# Patient Record
Sex: MALE | Race: BLACK OR AFRICAN AMERICAN | Employment: UNEMPLOYED | URBAN - METROPOLITAN AREA
[De-identification: names, ages, dates, MRNs, and addresses within clinical notes are randomized per-mention and may not be internally consistent; named-entity substitution may affect disease eponyms.]

---

## 2018-04-13 ENCOUNTER — HOSPITAL ENCOUNTER (INPATIENT)
Age: 68
LOS: 4 days | Discharge: HOME | DRG: 871 | End: 2018-04-18
Attending: EMERGENCY MEDICINE | Admitting: INTERNAL MEDICINE
Payer: MEDICARE

## 2018-04-13 DIAGNOSIS — A41.9 SEPSIS, DUE TO UNSPECIFIED ORGANISM: Primary | ICD-10-CM

## 2018-04-13 DIAGNOSIS — R77.8 ELEVATED TROPONIN: ICD-10-CM

## 2018-04-13 PROCEDURE — 93005 ELECTROCARDIOGRAM TRACING: CPT

## 2018-04-13 PROCEDURE — 51798 US URINE CAPACITY MEASURE: CPT

## 2018-04-13 PROCEDURE — 85610 PROTHROMBIN TIME: CPT | Performed by: EMERGENCY MEDICINE

## 2018-04-13 PROCEDURE — 83605 ASSAY OF LACTIC ACID: CPT | Performed by: EMERGENCY MEDICINE

## 2018-04-13 PROCEDURE — 36415 COLL VENOUS BLD VENIPUNCTURE: CPT | Performed by: EMERGENCY MEDICINE

## 2018-04-13 PROCEDURE — 87077 CULTURE AEROBIC IDENTIFY: CPT | Performed by: EMERGENCY MEDICINE

## 2018-04-13 PROCEDURE — 80053 COMPREHEN METABOLIC PANEL: CPT | Performed by: EMERGENCY MEDICINE

## 2018-04-13 PROCEDURE — 84484 ASSAY OF TROPONIN QUANT: CPT | Performed by: EMERGENCY MEDICINE

## 2018-04-13 PROCEDURE — 85025 COMPLETE CBC W/AUTO DIFF WBC: CPT | Performed by: EMERGENCY MEDICINE

## 2018-04-13 PROCEDURE — 87040 BLOOD CULTURE FOR BACTERIA: CPT | Performed by: EMERGENCY MEDICINE

## 2018-04-13 PROCEDURE — 87186 SC STD MICRODIL/AGAR DIL: CPT | Performed by: EMERGENCY MEDICINE

## 2018-04-13 PROCEDURE — 74011250636 HC RX REV CODE- 250/636: Performed by: EMERGENCY MEDICINE

## 2018-04-13 RX ORDER — ACETAMINOPHEN 500 MG
1000 TABLET ORAL
Status: DISCONTINUED | OUTPATIENT
Start: 2018-04-13 | End: 2018-04-14

## 2018-04-13 RX ORDER — CEPHALEXIN 500 MG/1
500 CAPSULE ORAL 4 TIMES DAILY
COMMUNITY
Start: 2018-04-13 | End: 2018-04-18

## 2018-04-13 RX ORDER — METFORMIN HYDROCHLORIDE 500 MG/1
250 TABLET ORAL 2 TIMES DAILY WITH MEALS
Status: ON HOLD | COMMUNITY
End: 2018-04-18

## 2018-04-13 RX ORDER — ACETAMINOPHEN 500 MG
500 TABLET ORAL
COMMUNITY
End: 2018-04-18

## 2018-04-13 RX ORDER — OXYBUTYNIN CHLORIDE 5 MG/1
5 TABLET ORAL 3 TIMES DAILY
COMMUNITY
End: 2018-04-18

## 2018-04-13 RX ORDER — OMEPRAZOLE 20 MG/1
20 CAPSULE, DELAYED RELEASE ORAL 2 TIMES DAILY
Status: ON HOLD | COMMUNITY
End: 2018-04-18

## 2018-04-13 RX ADMIN — SODIUM CHLORIDE 1000 ML: 900 INJECTION, SOLUTION INTRAVENOUS at 23:42

## 2018-04-13 NOTE — IP AVS SNAPSHOT
[address] 
[phone] Patient: Lazaro Garza MRN: [id number] :1950 About your hospitalization You were admitted on:  2018 You last received care in the:  [facility]  MED SURG 2 You were discharged on:  2018 Why you were hospitalized Your primary diagnosis was:  Not on File Your diagnoses also included:  Elevated Troponin, Septic Shock (Hcc), Scott (Acute Kidney Injury) (Hcc), High Anion Gap Metabolic Acidosis, Hydronephrosis With Renal And Ureteral Calculous Obstruction, Type 2 Diabetes Mellitus Without Complication (Hcc), Emphysema Of Lung (Hcc) Follow-up Information Follow up With Details Comments Contact Info Barby Zarate MD Schedule an appointment as soon as possible for a visit to schedule follow up [address] [place] [address] 
[phone] None   None (395) Patient stated that they have no PCP Discharge Orders None A check jeremy indicates which time of day the medication should be taken. My Medications START taking these medications Instructions Each Dose to Equal  
 Morning Noon Evening Bedtime  
 aspirin delayed-release 325 mg tablet Start taking on:  2018 Your last dose was:   
8 am  
Your next dose is:   
8 am  
   
 Take 1 Tab by mouth daily. 325 mg  
    
   
   
   
  
 atorvastatin 40 mg tablet Commonly known as:  LIPITOR Your last dose was:   
8 pm  
Your next dose is:   
8 pm  
   
 Take 1 Tab by mouth nightly. 40 mg  
    
   
   
   
  
 cefdinir 300 mg capsule Commonly known as:  OMNICEF Take 1 Cap by mouth two (2) times a day. 300 mg  
    
   
   
   
  
 metoprolol succinate 25 mg XL tablet Commonly known as:  TOPROL-XL Start taking on:  2018 Your last dose was:   
8 am  
Your next dose is:   
8 am  
   
 Take 0.5 Tabs by mouth daily. 12.5 mg  
    
   
   
   
  
  
CONTINUE taking these medications Instructions Each Dose to Equal  
 Morning Noon Evening Bedtime MATIAS Sierra Apply  to affected area two (2) times daily as needed. metFORMIN 500 mg tablet Commonly known as:  GLUCOPHAGE Take 250 mg by mouth two (2) times daily (with meals). 250 mg  
    
   
   
   
  
 omeprazole 20 mg capsule Commonly known as:  PRILOSEC Take 20 mg by mouth two (2) times a day. 20 mg  
    
   
   
   
  
 oxybutynin 5 mg tablet Commonly known as:  ANUJTRWP Take 5 mg by mouth three (3) times daily. 5 mg SENNA-S PO Take 1 Tab by mouth nightly. 1 Tab TYLENOL EXTRA STRENGTH 500 mg tablet Generic drug:  acetaminophen Take 500 mg by mouth three (3) times daily as needed for Fever (temperature greater than 100F). 500 mg  
    
   
   
   
  
 vitamin a & d ointment Commonly known as:  A&D Apply  to affected area three (3) times daily. Apply to affected area. STOP taking these medications KEFLEX 500 mg capsule Generic drug:  cephALEXin Where to Get Your Medications Information on where to get these meds will be given to you by the nurse or doctor. ! Ask your nurse or doctor about these medications  
  aspirin delayed-release 325 mg tablet  
 atorvastatin 40 mg tablet  
 cefdinir 300 mg capsule  
 metoprolol succinate 25 mg XL tablet Discharge Instructions HOSPITALIST DISCHARGE INSTRUCTIONS 
NAME: Shelley Aguiar :  1950 MRN:  [id number] Date/Time:  2018 12:09 PM 
 
ADMIT DATE: 2018 DISCHARGE DATE: 2018 ADMITTING DIAGNOSIS: 
Ureter stone UTI 
CAD DISCHARGE DIAGNOSIS: 
As above MEDICATIONS: 
 
· It is important that you take the medication exactly as they are prescribed. · Keep your medication in the bottles provided by the pharmacist and keep a list of the medication names, dosages, and times to be taken in your wallet. · Do not take other medications without consulting your doctor. Pain Management: per above medications What to do at Home: 1. Pt refused cardiac cath while hospitalized. His cardiac regimen has been optimized. He should follow up with [facility] for further care 2. Follow up with urology in 2-3 weeks for further management of kidney stone Recommended diet:  Cardiac Diet Recommended activity: Activity as tolerated If you experience any of the following symptoms then please call your primary care physician or return to the emergency room if you cannot get hold of your doctor: 
Fever, chills, nausea, vomiting, diarrhea, change in mentation, falling, bleeding, shortness of breath Information obtained by : 
I understand that if any problems occur once I am at home I am to contact my physician. I understand and acknowledge receipt of the instructions indicated above. Physician's or R.N.'s Signature                                                                  Date/Time Patient or Representative Signature                                                          Date/Time [employer] Announcement We are excited to announce that we are making your provider's discharge notes available to you in [employer]. You will see these notes when they are completed and signed by the physician that discharged you from your recent hospital stay.   If you have any questions or concerns about any information you see in [employer], please call the [employer] Department where you were seen or reach out to your Primary Care Provider for more information about your plan of care. Introducing [facility]! [employer] introduces uSpeakt patient portal. Now you can access parts of your medical record, email your doctor's office, and request medication refills online. 1. In your internet browser, go to [email or web address] 2. Click on the First Time User? Click Here link in the Sign In box. You will see the New Member Sign Up page. 3. Enter your Appstarter Access Code exactly as it appears below. You will not need to use this code after youve completed the sign-up process. If you do not sign up before the expiration date, you must request a new code. · Appstarter Access Code: [email or web address] Expires: 7/12/2018 10:57 PM 
 
4. Enter the last four digits of your Social Security Number (xxxx) and Date of Birth (mm/dd/yyyy) as indicated and click Submit. You will be taken to the next sign-up page. 5. Create a Appstarter ID. This will be your Appstarter login ID and cannot be changed, so think of one that is secure and easy to remember. 6. Create a Appstarter password. You can change your password at any time. 7. Enter your Password Reset Question and Answer. This can be used at a later time if you forget your password. 8. Enter your e-mail address. You will receive e-mail notification when new information is available in [address] 9. Click Sign Up. You can now view and download portions of your medical record. 10. Click the Download Summary menu link to download a portable copy of your medical information. If you have questions, please visit the Frequently Asked Questions section of the Appstarter website. Remember, Appstarter is NOT to be used for urgent needs. For medical emergencies, dial 911. Now available from your iPhone and Android! Introducing Partha Resendiz As a [facility] patient, I wanted to make you aware of our electronic visit tool called Partha Panfilogiorgiojyoti. [facility] 24/7 allows you to connect within minutes with a medical provider 24 hours a day, seven days a week via a mobile device or tablet or logging into a secure website from your computer. You can access Partha Whittakergiorgiofin from anywhere in the [place]. A virtual visit might be right for you when you have a simple condition and feel like you just dont want to get out of bed, or cant get away from work for an appointment, when your regular [facility] provider is not available (evenings, weekends or holidays), or when youre out of town and need minor care. Electronic visits cost only $49 and if the [facility] 24/7 provider determines a prescription is needed to treat your condition, one can be electronically transmitted to a nearby pharmacy*. Please take a moment to enroll today if you have not already done so. The enrollment process is free and takes just a few minutes. To enroll, please download the Valentin Uzhun 24/7 jodi to your tablet or phone, or visit [email or web address] to enroll on your computer. And, as an [address] [place] [address] patient with a [employer] account, the results of your visits will be scanned into your electronic medical record and your primary care provider will be able to view the scanned results. We urge you to continue to see your regular [facility] provider for your ongoing medical care. And while your primary care provider may not be the one available when you seek a Partha Panfiloletha virtual visit, the peace of mind you get from getting a real diagnosis real time can be priceless. For more information on Partha Panfiloletha, view our Frequently Asked Questions (FAQs) at [email or web address]. Sincerely, 
 
Joy Carvalho MD 
Chief Medical Officer [facility] *:  certain medications cannot be prescribed via Partha Resendiz Unresulted Labs-Please follow up with your PCP about these lab tests Order Current Status CULTURE, BLOOD Preliminary result CULTURE, BLOOD, PAIRED Preliminary result Providers Seen During Your Hospitalization Provider Specialty Primary office phone Aster Morales MD Emergency Medicine [phone] Alphia Nissen, MD Internal Medicine [phone] Rosemarie Lakhani MD Internal Medicine [phone] Tish Staley MD Internal Medicine [phone] Your Primary Care Physician (PCP) Primary Care Physician Office Phone Office Fax NONE ** None ** ** None ** You are allergic to the following No active allergies Recent Documentation Height Weight BMI Smoking Status 1.803 m 61.7 kg 18.97 kg/m2 Former Smoker Patient Belongings The following personal items are in your possession at time of discharge: 
  Dental Appliances: None  Visual Aid: At home      Home Medications: None   Jewelry: None  Clothing: With patient    Other Valuables: None Please provide this summary of care documentation to your next provider. Signatures-by signing, you are acknowledging that this After Visit Summary has been reviewed with you and you have received a copy. Patient Signature:  ____________________________________________________________ Date:  ____________________________________________________________  
  
Demar Andrew Provider Signature:  ____________________________________________________________ Date:  ____________________________________________________________

## 2018-04-14 ENCOUNTER — APPOINTMENT (OUTPATIENT)
Dept: CT IMAGING | Age: 68
DRG: 871 | End: 2018-04-14
Attending: INTERNAL MEDICINE
Payer: MEDICARE

## 2018-04-14 ENCOUNTER — ANESTHESIA EVENT (OUTPATIENT)
Dept: SURGERY | Age: 68
DRG: 871 | End: 2018-04-14
Payer: MEDICARE

## 2018-04-14 ENCOUNTER — APPOINTMENT (OUTPATIENT)
Dept: GENERAL RADIOLOGY | Age: 68
DRG: 871 | End: 2018-04-14
Attending: EMERGENCY MEDICINE
Payer: MEDICARE

## 2018-04-14 ENCOUNTER — ANESTHESIA (OUTPATIENT)
Dept: SURGERY | Age: 68
DRG: 871 | End: 2018-04-14
Payer: MEDICARE

## 2018-04-14 ENCOUNTER — APPOINTMENT (OUTPATIENT)
Dept: GENERAL RADIOLOGY | Age: 68
DRG: 871 | End: 2018-04-14
Attending: UROLOGY
Payer: MEDICARE

## 2018-04-14 ENCOUNTER — APPOINTMENT (OUTPATIENT)
Dept: CT IMAGING | Age: 68
DRG: 871 | End: 2018-04-14
Attending: EMERGENCY MEDICINE
Payer: MEDICARE

## 2018-04-14 PROBLEM — A41.9 SEVERE SEPSIS (HCC): Status: ACTIVE | Noted: 2018-04-14

## 2018-04-14 PROBLEM — R65.20 SEVERE SEPSIS (HCC): Status: ACTIVE | Noted: 2018-04-14

## 2018-04-14 PROBLEM — N17.9 AKI (ACUTE KIDNEY INJURY) (HCC): Status: ACTIVE | Noted: 2018-04-14

## 2018-04-14 PROBLEM — E87.29 HIGH ANION GAP METABOLIC ACIDOSIS: Status: ACTIVE | Noted: 2018-04-14

## 2018-04-14 PROBLEM — A41.9 SEPTIC SHOCK (HCC): Status: ACTIVE | Noted: 2018-04-14

## 2018-04-14 PROBLEM — E11.9 DIABETES (HCC): Status: ACTIVE | Noted: 2018-04-14

## 2018-04-14 PROBLEM — E11.9 TYPE 2 DIABETES MELLITUS WITHOUT COMPLICATION (HCC): Status: ACTIVE | Noted: 2018-04-14

## 2018-04-14 PROBLEM — N13.2 HYDRONEPHROSIS WITH RENAL AND URETERAL CALCULOUS OBSTRUCTION: Status: ACTIVE | Noted: 2018-04-14

## 2018-04-14 PROBLEM — R65.21 SEPTIC SHOCK (HCC): Status: ACTIVE | Noted: 2018-04-14

## 2018-04-14 PROBLEM — J43.9 EMPHYSEMA OF LUNG (HCC): Status: ACTIVE | Noted: 2018-04-14

## 2018-04-14 PROBLEM — R77.8 ELEVATED TROPONIN: Status: ACTIVE | Noted: 2018-04-14

## 2018-04-14 LAB
ALBUMIN SERPL-MCNC: 2.6 G/DL (ref 3.5–5)
ALBUMIN/GLOB SERPL: 0.5 {RATIO} (ref 1.1–2.2)
ALP SERPL-CCNC: 99 U/L (ref 45–117)
ALT SERPL-CCNC: 18 U/L (ref 12–78)
AMORPH CRY URNS QL MICRO: ABNORMAL
ANION GAP SERPL CALC-SCNC: 13 MMOL/L (ref 5–15)
ANION GAP SERPL CALC-SCNC: 15 MMOL/L (ref 5–15)
ANION GAP SERPL CALC-SCNC: 16 MMOL/L (ref 5–15)
APPEARANCE UR: CLEAR
APTT PPP: 40.5 SEC (ref 22.1–32)
ARTERIAL PATENCY WRIST A: YES
AST SERPL-CCNC: 20 U/L (ref 15–37)
B PERT DNA SPEC QL NAA+PROBE: NOT DETECTED
BACTERIA URNS QL MICRO: ABNORMAL /HPF
BASE DEFICIT BLDA-SCNC: 5.6 MMOL/L
BASOPHILS # BLD: 0 K/UL (ref 0–0.1)
BASOPHILS # BLD: 0 K/UL (ref 0–0.1)
BASOPHILS NFR BLD: 0 % (ref 0–1)
BASOPHILS NFR BLD: 0 % (ref 0–1)
BDY SITE: ABNORMAL
BILIRUB SERPL-MCNC: 0.9 MG/DL (ref 0.2–1)
BILIRUB UR QL CFM: NEGATIVE
BREATHS.SPONTANEOUS ON VENT: 24
BUN SERPL-MCNC: 18 MG/DL (ref 6–20)
BUN SERPL-MCNC: 19 MG/DL (ref 6–20)
BUN SERPL-MCNC: 22 MG/DL (ref 6–20)
BUN/CREAT SERPL: 13 (ref 12–20)
BUN/CREAT SERPL: 14 (ref 12–20)
BUN/CREAT SERPL: 14 (ref 12–20)
C PNEUM DNA SPEC QL NAA+PROBE: NOT DETECTED
CALCIUM SERPL-MCNC: 7 MG/DL (ref 8.5–10.1)
CALCIUM SERPL-MCNC: 7.4 MG/DL (ref 8.5–10.1)
CALCIUM SERPL-MCNC: 8.6 MG/DL (ref 8.5–10.1)
CHLORIDE SERPL-SCNC: 102 MMOL/L (ref 97–108)
CHLORIDE SERPL-SCNC: 103 MMOL/L (ref 97–108)
CHLORIDE SERPL-SCNC: 97 MMOL/L (ref 97–108)
CHOLEST SERPL-MCNC: 128 MG/DL
CHOLEST SERPL-MCNC: 92 MG/DL
CO2 SERPL-SCNC: 19 MMOL/L (ref 21–32)
CO2 SERPL-SCNC: 19 MMOL/L (ref 21–32)
CO2 SERPL-SCNC: 24 MMOL/L (ref 21–32)
COLOR UR: ABNORMAL
CREAT SERPL-MCNC: 1.4 MG/DL (ref 0.7–1.3)
CREAT SERPL-MCNC: 1.43 MG/DL (ref 0.7–1.3)
CREAT SERPL-MCNC: 1.55 MG/DL (ref 0.7–1.3)
DIFFERENTIAL METHOD BLD: ABNORMAL
DIFFERENTIAL METHOD BLD: ABNORMAL
EOSINOPHIL # BLD: 0 K/UL (ref 0–0.4)
EOSINOPHIL # BLD: 0 K/UL (ref 0–0.4)
EOSINOPHIL NFR BLD: 0 % (ref 0–7)
EOSINOPHIL NFR BLD: 0 % (ref 0–7)
EPITH CASTS URNS QL MICRO: ABNORMAL /LPF
ERYTHROCYTE [DISTWIDTH] IN BLOOD BY AUTOMATED COUNT: 16.3 % (ref 11.5–14.5)
ERYTHROCYTE [DISTWIDTH] IN BLOOD BY AUTOMATED COUNT: 16.3 % (ref 11.5–14.5)
FIO2 ON VENT: 21 %
FLUAV AG NPH QL IA: NEGATIVE
FLUAV H1 2009 PAND RNA SPEC QL NAA+PROBE: NOT DETECTED
FLUAV H1 RNA SPEC QL NAA+PROBE: NOT DETECTED
FLUAV H3 RNA SPEC QL NAA+PROBE: NOT DETECTED
FLUAV SUBTYP SPEC NAA+PROBE: NOT DETECTED
FLUBV AG NOSE QL IA: NEGATIVE
FLUBV RNA SPEC QL NAA+PROBE: NOT DETECTED
GLOBULIN SER CALC-MCNC: 5 G/DL (ref 2–4)
GLUCOSE BLD STRIP.AUTO-MCNC: 106 MG/DL (ref 65–100)
GLUCOSE BLD STRIP.AUTO-MCNC: 126 MG/DL (ref 65–100)
GLUCOSE BLD STRIP.AUTO-MCNC: 130 MG/DL (ref 65–100)
GLUCOSE SERPL-MCNC: 123 MG/DL (ref 65–100)
GLUCOSE SERPL-MCNC: 138 MG/DL (ref 65–100)
GLUCOSE SERPL-MCNC: 140 MG/DL (ref 65–100)
GLUCOSE UR STRIP.AUTO-MCNC: NEGATIVE MG/DL
HADV DNA SPEC QL NAA+PROBE: NOT DETECTED
HCO3 BLDA-SCNC: 16 MMOL/L (ref 22–26)
HCOV 229E RNA SPEC QL NAA+PROBE: NOT DETECTED
HCOV HKU1 RNA SPEC QL NAA+PROBE: NOT DETECTED
HCOV NL63 RNA SPEC QL NAA+PROBE: NOT DETECTED
HCOV OC43 RNA SPEC QL NAA+PROBE: NOT DETECTED
HCT VFR BLD AUTO: 29.6 % (ref 36.6–50.3)
HCT VFR BLD AUTO: 34.3 % (ref 36.6–50.3)
HDLC SERPL-MCNC: 22 MG/DL
HDLC SERPL-MCNC: 29 MG/DL
HDLC SERPL: 4.2 {RATIO} (ref 0–5)
HDLC SERPL: 4.4 {RATIO} (ref 0–5)
HGB BLD-MCNC: 11.2 G/DL (ref 12.1–17)
HGB BLD-MCNC: 9.6 G/DL (ref 12.1–17)
HGB UR QL STRIP: ABNORMAL
HMPV RNA SPEC QL NAA+PROBE: NOT DETECTED
HPIV1 RNA SPEC QL NAA+PROBE: NOT DETECTED
HPIV2 RNA SPEC QL NAA+PROBE: NOT DETECTED
HPIV3 RNA SPEC QL NAA+PROBE: NOT DETECTED
HPIV4 RNA SPEC QL NAA+PROBE: NOT DETECTED
INR PPP: 1.2 (ref 0.9–1.1)
KETONES UR QL STRIP.AUTO: 15 MG/DL
LACTATE SERPL-SCNC: 1 MMOL/L (ref 0.4–2)
LACTATE SERPL-SCNC: 1.1 MMOL/L (ref 0.4–2)
LDLC SERPL CALC-MCNC: 49 MG/DL (ref 0–100)
LDLC SERPL CALC-MCNC: 76.6 MG/DL (ref 0–100)
LEUKOCYTE ESTERASE UR QL STRIP.AUTO: NEGATIVE
LIPID PROFILE,FLP: NORMAL
LIPID PROFILE,FLP: NORMAL
LYMPHOCYTES # BLD: 0.7 K/UL
LYMPHOCYTES # BLD: 0.9 K/UL
LYMPHOCYTES NFR BLD: 5 % (ref 12–49)
LYMPHOCYTES NFR BLD: 6 % (ref 12–49)
M PNEUMO DNA SPEC QL NAA+PROBE: NOT DETECTED
MAGNESIUM SERPL-MCNC: 1.4 MG/DL (ref 1.6–2.4)
MCH RBC QN AUTO: 25.3 PG (ref 26–34)
MCH RBC QN AUTO: 25.5 PG (ref 26–34)
MCHC RBC AUTO-ENTMCNC: 32.4 G/DL (ref 30–36.5)
MCHC RBC AUTO-ENTMCNC: 32.7 G/DL (ref 30–36.5)
MCV RBC AUTO: 77.6 FL (ref 80–99)
MCV RBC AUTO: 78.7 FL (ref 80–99)
MONOCYTES # BLD: 0.7 K/UL (ref 0–1)
MONOCYTES # BLD: 0.8 K/UL (ref 0–1)
MONOCYTES NFR BLD: 5 % (ref 5–13)
MONOCYTES NFR BLD: 6 % (ref 5–13)
NEUTS SEG # BLD: 12.2 K/UL (ref 1.8–8)
NEUTS SEG # BLD: 13.1 K/UL (ref 1.8–8)
NEUTS SEG NFR BLD: 88 % (ref 32–75)
NEUTS SEG NFR BLD: 90 % (ref 32–75)
NITRITE UR QL STRIP.AUTO: NEGATIVE
PCO2 BLDA: 23 MMHG (ref 35–45)
PH BLDA: 7.47 [PH] (ref 7.35–7.45)
PH UR STRIP: 6 [PH] (ref 5–8)
PLATELET # BLD AUTO: 402 K/UL (ref 150–400)
PLATELET # BLD AUTO: 459 K/UL (ref 150–400)
PMV BLD AUTO: 8.9 FL (ref 8.9–12.9)
PMV BLD AUTO: 9.3 FL (ref 8.9–12.9)
PO2 BLDA: 78 MMHG (ref 80–100)
POTASSIUM SERPL-SCNC: 4 MMOL/L (ref 3.5–5.1)
POTASSIUM SERPL-SCNC: 4.1 MMOL/L (ref 3.5–5.1)
POTASSIUM SERPL-SCNC: 4.1 MMOL/L (ref 3.5–5.1)
PROT SERPL-MCNC: 7.6 G/DL (ref 6.4–8.2)
PROT UR STRIP-MCNC: 100 MG/DL
PROTHROMBIN TIME: 12 SEC (ref 9–11.1)
RBC # BLD AUTO: 3.76 M/UL (ref 4.1–5.7)
RBC # BLD AUTO: 4.42 M/UL (ref 4.1–5.7)
RBC #/AREA URNS HPF: ABNORMAL /HPF (ref 0–5)
RBC MORPH BLD: ABNORMAL
RBC MORPH BLD: ABNORMAL
RSV RNA SPEC QL NAA+PROBE: NOT DETECTED
RV+EV RNA SPEC QL NAA+PROBE: NOT DETECTED
SAO2 % BLD: 97 % (ref 92–97)
SAO2% DEVICE SAO2% SENSOR NAME: ABNORMAL
SERVICE CMNT-IMP: ABNORMAL
SODIUM SERPL-SCNC: 134 MMOL/L (ref 136–145)
SODIUM SERPL-SCNC: 137 MMOL/L (ref 136–145)
SODIUM SERPL-SCNC: 137 MMOL/L (ref 136–145)
SP GR UR REFRACTOMETRY: 1.02 (ref 1–1.03)
SPECIMEN SITE: ABNORMAL
THERAPEUTIC RANGE,PTTT: ABNORMAL SECS (ref 58–77)
TRIGL SERPL-MCNC: 105 MG/DL (ref ?–150)
TRIGL SERPL-MCNC: 112 MG/DL (ref ?–150)
TROPONIN I SERPL-MCNC: 1.99 NG/ML
TROPONIN I SERPL-MCNC: 3.15 NG/ML
TROPONIN I SERPL-MCNC: 3.77 NG/ML
UA: UC IF INDICATED,UAUC: ABNORMAL
UROBILINOGEN UR QL STRIP.AUTO: 0.2 EU/DL (ref 0.2–1)
VLDLC SERPL CALC-MCNC: 21 MG/DL
VLDLC SERPL CALC-MCNC: 22.4 MG/DL
WBC # BLD AUTO: 13.6 K/UL (ref 4.1–11.1)
WBC # BLD AUTO: 14.8 K/UL (ref 4.1–11.1)
WBC URNS QL MICRO: ABNORMAL /HPF (ref 0–4)
XXWBCSUS: 0
XXWBCSUS: 0

## 2018-04-14 PROCEDURE — 87077 CULTURE AEROBIC IDENTIFY: CPT | Performed by: INTERNAL MEDICINE

## 2018-04-14 PROCEDURE — C1769 GUIDE WIRE: HCPCS | Performed by: UROLOGY

## 2018-04-14 PROCEDURE — 74011000258 HC RX REV CODE- 258

## 2018-04-14 PROCEDURE — 83605 ASSAY OF LACTIC ACID: CPT | Performed by: EMERGENCY MEDICINE

## 2018-04-14 PROCEDURE — 74011250636 HC RX REV CODE- 250/636

## 2018-04-14 PROCEDURE — C2617 STENT, NON-COR, TEM W/O DEL: HCPCS | Performed by: UROLOGY

## 2018-04-14 PROCEDURE — 82962 GLUCOSE BLOOD TEST: CPT

## 2018-04-14 PROCEDURE — 36600 WITHDRAWAL OF ARTERIAL BLOOD: CPT | Performed by: INTERNAL MEDICINE

## 2018-04-14 PROCEDURE — 74011250636 HC RX REV CODE- 250/636: Performed by: INTERNAL MEDICINE

## 2018-04-14 PROCEDURE — 96360 HYDRATION IV INFUSION INIT: CPT

## 2018-04-14 PROCEDURE — 76010000138 HC OR TIME 0.5 TO 1 HR: Performed by: UROLOGY

## 2018-04-14 PROCEDURE — 74011250636 HC RX REV CODE- 250/636: Performed by: ANESTHESIOLOGY

## 2018-04-14 PROCEDURE — 74011000250 HC RX REV CODE- 250

## 2018-04-14 PROCEDURE — 74011000258 HC RX REV CODE- 258: Performed by: INTERNAL MEDICINE

## 2018-04-14 PROCEDURE — 77030026438 HC STYL ET INTUB CARD -A: Performed by: ANESTHESIOLOGY

## 2018-04-14 PROCEDURE — 85730 THROMBOPLASTIN TIME PARTIAL: CPT | Performed by: EMERGENCY MEDICINE

## 2018-04-14 PROCEDURE — 74011250637 HC RX REV CODE- 250/637: Performed by: EMERGENCY MEDICINE

## 2018-04-14 PROCEDURE — 80061 LIPID PANEL: CPT | Performed by: INTERNAL MEDICINE

## 2018-04-14 PROCEDURE — 81001 URINALYSIS AUTO W/SCOPE: CPT | Performed by: EMERGENCY MEDICINE

## 2018-04-14 PROCEDURE — 75810000137 HC PLCMT CENT VENOUS CATH

## 2018-04-14 PROCEDURE — 74011250636 HC RX REV CODE- 250/636: Performed by: EMERGENCY MEDICINE

## 2018-04-14 PROCEDURE — 74011000250 HC RX REV CODE- 250: Performed by: EMERGENCY MEDICINE

## 2018-04-14 PROCEDURE — 65660000000 HC RM CCU STEPDOWN

## 2018-04-14 PROCEDURE — 77030019927 HC TBNG IRR CYSTO BAXT -A: Performed by: UROLOGY

## 2018-04-14 PROCEDURE — 0T938ZX DRAINAGE OF RIGHT KIDNEY PELVIS, VIA NATURAL OR ARTIFICIAL OPENING ENDOSCOPIC, DIAGNOSTIC: ICD-10-PCS | Performed by: UROLOGY

## 2018-04-14 PROCEDURE — 36415 COLL VENOUS BLD VENIPUNCTURE: CPT | Performed by: EMERGENCY MEDICINE

## 2018-04-14 PROCEDURE — 77030008684 HC TU ET CUF COVD -B: Performed by: ANESTHESIOLOGY

## 2018-04-14 PROCEDURE — 87804 INFLUENZA ASSAY W/OPTIC: CPT | Performed by: EMERGENCY MEDICINE

## 2018-04-14 PROCEDURE — 85025 COMPLETE CBC W/AUTO DIFF WBC: CPT | Performed by: EMERGENCY MEDICINE

## 2018-04-14 PROCEDURE — 96367 TX/PROPH/DG ADDL SEQ IV INF: CPT

## 2018-04-14 PROCEDURE — 77030018832 HC SOL IRR H20 ICUM -A: Performed by: UROLOGY

## 2018-04-14 PROCEDURE — 77030011943

## 2018-04-14 PROCEDURE — 80048 BASIC METABOLIC PNL TOTAL CA: CPT | Performed by: INTERNAL MEDICINE

## 2018-04-14 PROCEDURE — 77030019908 HC STETH ESOPH SIMS -A: Performed by: ANESTHESIOLOGY

## 2018-04-14 PROCEDURE — 87633 RESP VIRUS 12-25 TARGETS: CPT | Performed by: INTERNAL MEDICINE

## 2018-04-14 PROCEDURE — 71045 X-RAY EXAM CHEST 1 VIEW: CPT

## 2018-04-14 PROCEDURE — 93005 ELECTROCARDIOGRAM TRACING: CPT

## 2018-04-14 PROCEDURE — 0T768DZ DILATION OF RIGHT URETER WITH INTRALUMINAL DEVICE, VIA NATURAL OR ARTIFICIAL OPENING ENDOSCOPIC: ICD-10-PCS | Performed by: UROLOGY

## 2018-04-14 PROCEDURE — 82803 BLOOD GASES ANY COMBINATION: CPT | Performed by: INTERNAL MEDICINE

## 2018-04-14 PROCEDURE — 96365 THER/PROPH/DIAG IV INF INIT: CPT

## 2018-04-14 PROCEDURE — 96361 HYDRATE IV INFUSION ADD-ON: CPT

## 2018-04-14 PROCEDURE — 77030034696 HC CATH URETH FOL 2W BARD -A: Performed by: UROLOGY

## 2018-04-14 PROCEDURE — 74176 CT ABD & PELVIS W/O CONTRAST: CPT

## 2018-04-14 PROCEDURE — 76210000010 HC REC RM PH I 7 TO 7.5 HR: Performed by: UROLOGY

## 2018-04-14 PROCEDURE — 99285 EMERGENCY DEPT VISIT HI MDM: CPT

## 2018-04-14 PROCEDURE — 74011250637 HC RX REV CODE- 250/637: Performed by: INTERNAL MEDICINE

## 2018-04-14 PROCEDURE — 74420 UROGRAPHY RTRGR +-KUB: CPT

## 2018-04-14 PROCEDURE — 87086 URINE CULTURE/COLONY COUNT: CPT | Performed by: EMERGENCY MEDICINE

## 2018-04-14 PROCEDURE — 76060000032 HC ANESTHESIA 0.5 TO 1 HR: Performed by: UROLOGY

## 2018-04-14 PROCEDURE — 74011000258 HC RX REV CODE- 258: Performed by: EMERGENCY MEDICINE

## 2018-04-14 PROCEDURE — 71250 CT THORAX DX C-: CPT

## 2018-04-14 PROCEDURE — C1758 CATHETER, URETERAL: HCPCS | Performed by: UROLOGY

## 2018-04-14 PROCEDURE — 74011636320 HC RX REV CODE- 636/320: Performed by: UROLOGY

## 2018-04-14 PROCEDURE — 76000 FLUOROSCOPY <1 HR PHYS/QHP: CPT

## 2018-04-14 PROCEDURE — C1751 CATH, INF, PER/CENT/MIDLINE: HCPCS

## 2018-04-14 PROCEDURE — 83735 ASSAY OF MAGNESIUM: CPT | Performed by: INTERNAL MEDICINE

## 2018-04-14 PROCEDURE — 74011000250 HC RX REV CODE- 250: Performed by: INTERNAL MEDICINE

## 2018-04-14 PROCEDURE — 87186 SC STD MICRODIL/AGAR DIL: CPT | Performed by: INTERNAL MEDICINE

## 2018-04-14 PROCEDURE — 87086 URINE CULTURE/COLONY COUNT: CPT | Performed by: INTERNAL MEDICINE

## 2018-04-14 PROCEDURE — BT1D1ZZ FLUOROSCOPY OF RIGHT KIDNEY, URETER AND BLADDER USING LOW OSMOLAR CONTRAST: ICD-10-PCS | Performed by: UROLOGY

## 2018-04-14 DEVICE — URETERAL STENT
Type: IMPLANTABLE DEVICE | Site: URETER | Status: FUNCTIONAL
Brand: CONTOUR™

## 2018-04-14 RX ORDER — HEPARIN SODIUM 10000 [USP'U]/100ML
12-25 INJECTION, SOLUTION INTRAVENOUS
Status: DISCONTINUED | OUTPATIENT
Start: 2018-04-14 | End: 2018-04-16

## 2018-04-14 RX ORDER — VANCOMYCIN/0.9 % SOD CHLORIDE 1.5G/250ML
1500 PLASTIC BAG, INJECTION (ML) INTRAVENOUS ONCE
Status: COMPLETED | OUTPATIENT
Start: 2018-04-14 | End: 2018-04-14

## 2018-04-14 RX ORDER — EPHEDRINE SULFATE 50 MG/ML
INJECTION, SOLUTION INTRAVENOUS AS NEEDED
Status: DISCONTINUED | OUTPATIENT
Start: 2018-04-14 | End: 2018-04-14 | Stop reason: HOSPADM

## 2018-04-14 RX ORDER — SODIUM CHLORIDE 0.9 % (FLUSH) 0.9 %
5-10 SYRINGE (ML) INJECTION EVERY 8 HOURS
Status: DISCONTINUED | OUTPATIENT
Start: 2018-04-14 | End: 2018-04-14 | Stop reason: HOSPADM

## 2018-04-14 RX ORDER — SODIUM CHLORIDE, SODIUM LACTATE, POTASSIUM CHLORIDE, CALCIUM CHLORIDE 600; 310; 30; 20 MG/100ML; MG/100ML; MG/100ML; MG/100ML
125 INJECTION, SOLUTION INTRAVENOUS CONTINUOUS
Status: DISCONTINUED | OUTPATIENT
Start: 2018-04-14 | End: 2018-04-14 | Stop reason: HOSPADM

## 2018-04-14 RX ORDER — PANTOPRAZOLE SODIUM 40 MG/1
40 TABLET, DELAYED RELEASE ORAL
Status: DISCONTINUED | OUTPATIENT
Start: 2018-04-15 | End: 2018-04-18 | Stop reason: HOSPADM

## 2018-04-14 RX ORDER — ROCURONIUM BROMIDE 10 MG/ML
INJECTION, SOLUTION INTRAVENOUS AS NEEDED
Status: DISCONTINUED | OUTPATIENT
Start: 2018-04-14 | End: 2018-04-14 | Stop reason: HOSPADM

## 2018-04-14 RX ORDER — ONDANSETRON 2 MG/ML
4 INJECTION INTRAMUSCULAR; INTRAVENOUS AS NEEDED
Status: DISCONTINUED | OUTPATIENT
Start: 2018-04-14 | End: 2018-04-14 | Stop reason: HOSPADM

## 2018-04-14 RX ORDER — GUAIFENESIN 100 MG/5ML
162 LIQUID (ML) ORAL
Status: COMPLETED | OUTPATIENT
Start: 2018-04-14 | End: 2018-04-14

## 2018-04-14 RX ORDER — INSULIN LISPRO 100 [IU]/ML
INJECTION, SOLUTION INTRAVENOUS; SUBCUTANEOUS
Status: DISCONTINUED | OUTPATIENT
Start: 2018-04-14 | End: 2018-04-18 | Stop reason: HOSPADM

## 2018-04-14 RX ORDER — LIDOCAINE HYDROCHLORIDE 10 MG/ML
0.1 INJECTION, SOLUTION EPIDURAL; INFILTRATION; INTRACAUDAL; PERINEURAL AS NEEDED
Status: DISCONTINUED | OUTPATIENT
Start: 2018-04-14 | End: 2018-04-14 | Stop reason: HOSPADM

## 2018-04-14 RX ORDER — DEXTROSE 50 % IN WATER (D50W) INTRAVENOUS SYRINGE
12.5-25 AS NEEDED
Status: DISCONTINUED | OUTPATIENT
Start: 2018-04-14 | End: 2018-04-18 | Stop reason: HOSPADM

## 2018-04-14 RX ORDER — ASPIRIN 81 MG/1
81 TABLET ORAL DAILY
Status: DISCONTINUED | OUTPATIENT
Start: 2018-04-15 | End: 2018-04-15

## 2018-04-14 RX ORDER — HYDROMORPHONE HYDROCHLORIDE 2 MG/ML
.25-1 INJECTION, SOLUTION INTRAMUSCULAR; INTRAVENOUS; SUBCUTANEOUS
Status: DISCONTINUED | OUTPATIENT
Start: 2018-04-14 | End: 2018-04-14 | Stop reason: HOSPADM

## 2018-04-14 RX ORDER — SODIUM CHLORIDE 0.9 % (FLUSH) 0.9 %
5-10 SYRINGE (ML) INJECTION AS NEEDED
Status: DISCONTINUED | OUTPATIENT
Start: 2018-04-14 | End: 2018-04-14 | Stop reason: HOSPADM

## 2018-04-14 RX ORDER — HEPARIN SODIUM 5000 [USP'U]/ML
60 INJECTION, SOLUTION INTRAVENOUS; SUBCUTANEOUS ONCE
Status: COMPLETED | OUTPATIENT
Start: 2018-04-14 | End: 2018-04-14

## 2018-04-14 RX ORDER — FENTANYL CITRATE 50 UG/ML
INJECTION, SOLUTION INTRAMUSCULAR; INTRAVENOUS AS NEEDED
Status: DISCONTINUED | OUTPATIENT
Start: 2018-04-14 | End: 2018-04-14 | Stop reason: HOSPADM

## 2018-04-14 RX ORDER — NALOXONE HYDROCHLORIDE 0.4 MG/ML
0.4 INJECTION, SOLUTION INTRAMUSCULAR; INTRAVENOUS; SUBCUTANEOUS AS NEEDED
Status: DISCONTINUED | OUTPATIENT
Start: 2018-04-14 | End: 2018-04-18 | Stop reason: HOSPADM

## 2018-04-14 RX ORDER — SODIUM CHLORIDE 9 MG/ML
125 INJECTION, SOLUTION INTRAVENOUS CONTINUOUS
Status: DISCONTINUED | OUTPATIENT
Start: 2018-04-14 | End: 2018-04-16

## 2018-04-14 RX ORDER — MIDAZOLAM HYDROCHLORIDE 1 MG/ML
INJECTION, SOLUTION INTRAMUSCULAR; INTRAVENOUS AS NEEDED
Status: DISCONTINUED | OUTPATIENT
Start: 2018-04-14 | End: 2018-04-14 | Stop reason: HOSPADM

## 2018-04-14 RX ORDER — ACETAMINOPHEN 500 MG
TABLET ORAL
Status: DISPENSED
Start: 2018-04-14 | End: 2018-04-14

## 2018-04-14 RX ORDER — SUCCINYLCHOLINE CHLORIDE 20 MG/ML
INJECTION INTRAMUSCULAR; INTRAVENOUS AS NEEDED
Status: DISCONTINUED | OUTPATIENT
Start: 2018-04-14 | End: 2018-04-14 | Stop reason: HOSPADM

## 2018-04-14 RX ORDER — MAGNESIUM SULFATE HEPTAHYDRATE 40 MG/ML
2 INJECTION, SOLUTION INTRAVENOUS ONCE
Status: COMPLETED | OUTPATIENT
Start: 2018-04-14 | End: 2018-04-14

## 2018-04-14 RX ORDER — GUAIFENESIN 100 MG/5ML
LIQUID (ML) ORAL
Status: DISPENSED
Start: 2018-04-14 | End: 2018-04-14

## 2018-04-14 RX ORDER — CEFTRIAXONE 1 G/1
INJECTION, POWDER, FOR SOLUTION INTRAMUSCULAR; INTRAVENOUS
Status: DISPENSED
Start: 2018-04-14 | End: 2018-04-14

## 2018-04-14 RX ORDER — MAGNESIUM SULFATE 100 %
4 CRYSTALS MISCELLANEOUS AS NEEDED
Status: DISCONTINUED | OUTPATIENT
Start: 2018-04-14 | End: 2018-04-18 | Stop reason: HOSPADM

## 2018-04-14 RX ORDER — ONDANSETRON 2 MG/ML
INJECTION INTRAMUSCULAR; INTRAVENOUS AS NEEDED
Status: DISCONTINUED | OUTPATIENT
Start: 2018-04-14 | End: 2018-04-14 | Stop reason: HOSPADM

## 2018-04-14 RX ORDER — DIPHENHYDRAMINE HYDROCHLORIDE 50 MG/ML
12.5 INJECTION, SOLUTION INTRAMUSCULAR; INTRAVENOUS AS NEEDED
Status: DISCONTINUED | OUTPATIENT
Start: 2018-04-14 | End: 2018-04-14 | Stop reason: HOSPADM

## 2018-04-14 RX ORDER — SODIUM CHLORIDE 0.9 % (FLUSH) 0.9 %
5-10 SYRINGE (ML) INJECTION EVERY 8 HOURS
Status: DISCONTINUED | OUTPATIENT
Start: 2018-04-14 | End: 2018-04-18 | Stop reason: HOSPADM

## 2018-04-14 RX ORDER — SODIUM CHLORIDE, SODIUM LACTATE, POTASSIUM CHLORIDE, CALCIUM CHLORIDE 600; 310; 30; 20 MG/100ML; MG/100ML; MG/100ML; MG/100ML
INJECTION, SOLUTION INTRAVENOUS
Status: DISCONTINUED | OUTPATIENT
Start: 2018-04-14 | End: 2018-04-14 | Stop reason: HOSPADM

## 2018-04-14 RX ORDER — PROPOFOL 10 MG/ML
INJECTION, EMULSION INTRAVENOUS AS NEEDED
Status: DISCONTINUED | OUTPATIENT
Start: 2018-04-14 | End: 2018-04-14 | Stop reason: HOSPADM

## 2018-04-14 RX ORDER — SODIUM CHLORIDE 900 MG/100ML
INJECTION INTRAVENOUS
Status: COMPLETED
Start: 2018-04-14 | End: 2018-04-14

## 2018-04-14 RX ORDER — ETOMIDATE 2 MG/ML
INJECTION INTRAVENOUS AS NEEDED
Status: DISCONTINUED | OUTPATIENT
Start: 2018-04-14 | End: 2018-04-14 | Stop reason: HOSPADM

## 2018-04-14 RX ORDER — ATORVASTATIN CALCIUM 10 MG/1
40 TABLET, FILM COATED ORAL
Status: DISCONTINUED | OUTPATIENT
Start: 2018-04-14 | End: 2018-04-18 | Stop reason: HOSPADM

## 2018-04-14 RX ORDER — ETHYL ALCOHOL 70 %
SOLUTION, NON-ORAL TOPICAL 3 TIMES DAILY
COMMUNITY
End: 2018-04-18

## 2018-04-14 RX ORDER — LIDOCAINE HYDROCHLORIDE 20 MG/ML
INJECTION, SOLUTION EPIDURAL; INFILTRATION; INTRACAUDAL; PERINEURAL AS NEEDED
Status: DISCONTINUED | OUTPATIENT
Start: 2018-04-14 | End: 2018-04-14 | Stop reason: HOSPADM

## 2018-04-14 RX ORDER — METRONIDAZOLE 500 MG/100ML
500 INJECTION, SOLUTION INTRAVENOUS EVERY 6 HOURS
Status: DISCONTINUED | OUTPATIENT
Start: 2018-04-14 | End: 2018-04-15

## 2018-04-14 RX ORDER — ACETAMINOPHEN 500 MG
1000 TABLET ORAL
Status: COMPLETED | OUTPATIENT
Start: 2018-04-14 | End: 2018-04-14

## 2018-04-14 RX ORDER — SODIUM CHLORIDE 0.9 % (FLUSH) 0.9 %
5-10 SYRINGE (ML) INJECTION AS NEEDED
Status: DISCONTINUED | OUTPATIENT
Start: 2018-04-14 | End: 2018-04-18 | Stop reason: HOSPADM

## 2018-04-14 RX ADMIN — PIPERACILLIN SODIUM AND TAZOBACTAM SODIUM 3.38 G: 3; .375 INJECTION, POWDER, LYOPHILIZED, FOR SOLUTION INTRAVENOUS at 13:42

## 2018-04-14 RX ADMIN — SODIUM CHLORIDE 1000 ML: 900 INJECTION, SOLUTION INTRAVENOUS at 01:16

## 2018-04-14 RX ADMIN — Medication 10 ML: at 06:23

## 2018-04-14 RX ADMIN — LIDOCAINE HYDROCHLORIDE 40 MG: 20 INJECTION, SOLUTION EPIDURAL; INFILTRATION; INTRACAUDAL; PERINEURAL at 10:12

## 2018-04-14 RX ADMIN — FENTANYL CITRATE 50 MCG: 50 INJECTION, SOLUTION INTRAMUSCULAR; INTRAVENOUS at 10:12

## 2018-04-14 RX ADMIN — VANCOMYCIN HYDROCHLORIDE 1500 MG: 10 INJECTION, POWDER, LYOPHILIZED, FOR SOLUTION INTRAVENOUS at 02:01

## 2018-04-14 RX ADMIN — HEPARIN SODIUM 12 UNITS/KG/HR: 10000 INJECTION, SOLUTION INTRAVENOUS at 06:17

## 2018-04-14 RX ADMIN — METRONIDAZOLE 500 MG: 500 INJECTION, SOLUTION INTRAVENOUS at 06:03

## 2018-04-14 RX ADMIN — SODIUM CHLORIDE 100 ML/HR: 900 INJECTION, SOLUTION INTRAVENOUS at 06:06

## 2018-04-14 RX ADMIN — ATORVASTATIN CALCIUM 40 MG: 20 TABLET, FILM COATED ORAL at 22:28

## 2018-04-14 RX ADMIN — PROPOFOL 20 MG: 10 INJECTION, EMULSION INTRAVENOUS at 10:42

## 2018-04-14 RX ADMIN — ONDANSETRON 4 MG: 2 INJECTION INTRAMUSCULAR; INTRAVENOUS at 10:34

## 2018-04-14 RX ADMIN — SODIUM CHLORIDE: 900 INJECTION, SOLUTION INTRAVENOUS at 01:43

## 2018-04-14 RX ADMIN — SUCCINYLCHOLINE CHLORIDE 180 MG: 20 INJECTION INTRAMUSCULAR; INTRAVENOUS at 10:12

## 2018-04-14 RX ADMIN — FENTANYL CITRATE 25 MCG: 50 INJECTION, SOLUTION INTRAMUSCULAR; INTRAVENOUS at 10:30

## 2018-04-14 RX ADMIN — PROPOFOL 50 MG: 10 INJECTION, EMULSION INTRAVENOUS at 10:12

## 2018-04-14 RX ADMIN — SODIUM CHLORIDE, SODIUM LACTATE, POTASSIUM CHLORIDE, CALCIUM CHLORIDE: 600; 310; 30; 20 INJECTION, SOLUTION INTRAVENOUS at 10:03

## 2018-04-14 RX ADMIN — CEFTRIAXONE SODIUM 1 G: 1 INJECTION, POWDER, FOR SOLUTION INTRAMUSCULAR; INTRAVENOUS at 01:17

## 2018-04-14 RX ADMIN — EPHEDRINE SULFATE 5 MG: 50 INJECTION, SOLUTION INTRAVENOUS at 10:30

## 2018-04-14 RX ADMIN — MAGNESIUM SULFATE HEPTAHYDRATE 2 G: 40 INJECTION, SOLUTION INTRAVENOUS at 05:38

## 2018-04-14 RX ADMIN — ACETAMINOPHEN 1000 MG: 500 TABLET ORAL at 01:28

## 2018-04-14 RX ADMIN — NOREPINEPHRINE BITARTRATE 0.5 MCG/MIN: 1 INJECTION, SOLUTION, CONCENTRATE INTRAVENOUS at 04:35

## 2018-04-14 RX ADMIN — HEPARIN SODIUM 3700 UNITS: 5000 INJECTION, SOLUTION INTRAVENOUS; SUBCUTANEOUS at 05:40

## 2018-04-14 RX ADMIN — MIDAZOLAM HYDROCHLORIDE 1 MG: 1 INJECTION, SOLUTION INTRAMUSCULAR; INTRAVENOUS at 10:06

## 2018-04-14 RX ADMIN — METRONIDAZOLE 500 MG: 500 INJECTION, SOLUTION INTRAVENOUS at 12:39

## 2018-04-14 RX ADMIN — SODIUM CHLORIDE 1000 ML: 900 INJECTION, SOLUTION INTRAVENOUS at 00:08

## 2018-04-14 RX ADMIN — MIDAZOLAM HYDROCHLORIDE 1 MG: 1 INJECTION, SOLUTION INTRAMUSCULAR; INTRAVENOUS at 10:03

## 2018-04-14 RX ADMIN — ETOMIDATE 8 MG: 2 INJECTION INTRAVENOUS at 10:12

## 2018-04-14 RX ADMIN — ASPIRIN 81 MG 162 MG: 81 TABLET ORAL at 01:16

## 2018-04-14 RX ADMIN — PIPERACILLIN SODIUM AND TAZOBACTAM SODIUM 3.38 G: 3; .375 INJECTION, POWDER, LYOPHILIZED, FOR SOLUTION INTRAVENOUS at 20:17

## 2018-04-14 RX ADMIN — ROCURONIUM BROMIDE 5 MG: 10 INJECTION, SOLUTION INTRAVENOUS at 10:12

## 2018-04-14 RX ADMIN — SODIUM CHLORIDE 500 ML: 900 INJECTION, SOLUTION INTRAVENOUS at 09:42

## 2018-04-14 RX ADMIN — FENTANYL CITRATE 50 MCG: 50 INJECTION, SOLUTION INTRAMUSCULAR; INTRAVENOUS at 10:06

## 2018-04-14 NOTE — ED NOTES
Correctional officers removed wrist restraints /cuffs. Patient to OR A/O x 4 via hospital bed on cardiac monitor showing sinus tachycardia. IV NSS @ 999 ml/hr Left forearm and Levophed gtt infusing 5mcg/min or 18.8 ml/hr per pump via triple lumen catheter right neck; BP MAP within ordered parameters.  VS updated 09:45 AM.

## 2018-04-14 NOTE — CONSULTS
[address]  MR#: [id number]  : 1950  ACCOUNT #: [de-identified]   DATE OF SERVICE: 2018    DOCTOR REQUESTING:  Dr. Roque Lunch:  Right ureteral calculus with obstruction, hydronephrosis and sepsis. HISTORY:  This is a 49-year-old gentleman who is a very poor historian who presented to the Emergency Department with a history of fever and he had developed this several days ago. He is unable to give us any details about his fever, but the EMS had noted his fever was 103.5. He had really no abdominal pain, flank pain or any other associated symptoms on questioning. The history is reviewed as per his chart and he had no other significant complaints. He is unable to give me any details about this. He was seen in the Emergency Department, had a CT scan done and this had shown a 9 mm distal right ureteral calculus with hydronephrosis which was marked. He was admitted to the medical service and we were asked to see him in consultation for this. This is apparently longstanding, localized to his right side, exacerbated with infection and associated symptoms of some tachycardia and hypotension and history of fevers. His medical history is reviewed as per his chart. He has history of diabetes, gastrointestinal disorder and a history of liver failure. SURGICAL HISTORY:  Reviewed as well and significant for history of prostatectomy. FAMILY HISTORY:  Noncontributory. SOCIAL HISTORY:  He is single and he is a former smoker. He is currently an inmate at present. ALLERGIES:  NONE. REVIEW OF SYSTEMS:  Positive for chills and fever, decreased urine volume, difficulty urinating. No flank pain or other symptoms as per his chart and this was all reviewed. PHYSICAL EXAMINATION:  GENERAL:  He is alert, cooperative and responsive, but is unable to give any details about anything.   VITAL SIGNS:  His blood pressure is 118/68, pulse 124, respirations 23. HEENT:  Normal calvaria without evidence of trauma. Pupils were equal.  Sclerae are clear. Nose is normal.  NECK:  Supple, without adenopathy. CARDIAC:  Regular. Some tachycardia. CHEST:  No labored breathing. ABDOMEN:  Soft. He has no CVA tenderness, no abdominal distention and no pain. EXTREMITIES:  No edema and nontender. SKIN:  Visualized portion of his skin were dry and intact. PSYCHIATRIC:  He has a normal affect and mood and behavior, but seems somewhat confused. LABORATORY VALUES:  Reviewed. His CT of the abdomen and pelvis was reviewed. This did show a 6 x 8 x 9 mm distal right ureteral calculus with severe right hydronephrosis. No other significant findings were noted. He had emphysema, right lower lobe fibrosis and borderline aneurysm of the abdominal aorta. His creatinine was slightly elevated at 1.4. CO2 is 19. His urinalysis did show 5-10 white cells, 0-5 red cells and 1+ bacteria. His white count 14.8, hemoglobin 9.6. ASSESSMENT:  1. Distal right ureteral calculus with associated hydronephrosis and what appears to be some sepsis. He is going to need a cystoscopy and stent placement urgently. Indications and risks were reviewed with him. 2.  Urinary tract infection/sepsis, on IV antibiotics currently. 3.  Hydronephrosis. Will treat this with his stent placement. He may need a percutaneous nephrostomy tube if this is difficult or unsuccessful. 4.  Elevated creatinine, probably secondary to his ureteral obstruction. 5.  History of prostate cancer. 6.  Some evidence of confusion, possibly infection related. Will defer to his attending for further management.       MD CRISSY Colunga / Sarah Reynolds  D: 04/14/2018 09:02     T: 04/14/2018 09:27  JOB #: [id number]

## 2018-04-14 NOTE — CONSULTS
[address] [facility]  MR#: [id number]  : 1950  ACCOUNT #: [de-identified]   DATE OF SERVICE: 2018    HISTORY OF PRESENT ILLNESS:  We were kindly asked by Dr. Urmila Bui to see the patient in regards to critical care consultation. He is a 76-year-old male with a history of prostate cancer and diabetes who presented with fever for the past 3 days with associated T-max of 103.5. He also had some tachycardia and hypotension as well as an elevation of his serum creatinine. He was admitted for presumed urosepsis. He was seen and evaluated by urology who took him to the operating room and he had removal of a stent. A large amount of pus was drained. Currently, he is off of pressors and has stable hemodynamics on intravenous fluids. Patient is alert, awake and denies any problems. He is not having any shortness of breath, chest pain or nausea, vomiting. PAST MEDICAL HISTORY:  Notable for diabetes mellitus. He has a history of GERD. He has a history of prostate cancer. He had a prostatectomy in 2018. He has had recurrent UTIs  since that time. SOCIAL HISTORY:  He is a former smoker. FAMILY HISTORY:  Remarkable for no inheritable lung disease. REVIEW OF SYSTEMS:  A 10-system comprehensive review of systems was remarkable for prior to admissions, he felt bad and had fevers and chills. He denied any shortness of breath, cough or sputum. He denied any palpitations, chest pain. He has not had any swelling. He did not have any nausea or vomiting. He did have some diarrhea. The rest of the comprehensive review of system was performed and was negative except what is mentioned in the HPI. PHYSICAL EXAMINATION:    GENERAL: He was a well-nourished, well-developed Afro-American male who is breathing comfortably on room air. VITAL SIGNS:  Blood pressure is 94/60, heart rate is 90, saturations are 98%. HEENT:  He is anicteric.   Oropharynx is dry.  NECK:  Supple. There is no JVDs. No adenopathy. LUNGS:  He has good air movement bilaterally without any rales, rhonchi or wheezes. ABDOMEN:  Soft, nontender, active bowel sounds, no hepatosplenomegaly. EXTREMITIES:  Demonstrate no edema, no calf tenderness, no asymmetry, no palpable cords. SKIN:  Warm and dry. NEUROLOGIC:  He is grossly intact. MUSCULOSKELETAL: He has good tone. GENITOURINARY AND RECTAL:  Deferred. LABORATORY DATA:  Sodium 137, K of 4.0, chloride 103, CO2 19, glucose 138, BUN 18, creatinine of 1.43. CBC: WBCs of 14.8, hemoglobin of 9.6, 102,000 platelets. Arterial blood gas on room air demonstrated a pO2 of 78, a pCO2 of 23, a pH of 7.47. Chest x-ray was performed and demonstrates clear lung fields. He has a hiatal hernia. ASSESSMENT:  The patient presents with a history of urinary obstruction and recurrent UTIs following radical prostatectomy. He has been seen and evaluated, taken to the OR and has had appropriate drainage and stent placement. He should respond quickly to intravenous antibiotics and IV fluids. He has already been weaned off of pressor agents. Over the next few hours, he should continue to stabilize and he should be able to have his level of care deescalated to step down or telemetry. Thank you very much for asking us to see the patient in consultation. We will be glad to assist in his management as you feel clinically indicated.       MD VANESSA Valera / STEFFEN  D: 04/14/2018 13:25     T: 04/14/2018 15:16  JOB #: [id number]  CC: Myla Najera MD  CC: Elsie Calvo MD

## 2018-04-14 NOTE — PROGRESS NOTES
[facility]: MED RECONCILIATION    Comments/Recommendations:    Patient does not know what medications he is currently taking.  Medication reconciliation completed using transfer papers/MAR from correctional facility    Allergies: Review of patient's allergies indicates no known allergies. Prior to Admission Medications   Prescriptions Last Dose Informant Patient Reported? Taking? METHYL SALICYLATE/MENTHOL (MATIAS ROBBINS EX)  Transfer Papers Yes Yes   Sig: Apply  to affected area two (2) times daily as needed. SENNOSIDES/DOCUSATE SODIUM (SENNA-S PO) 4/13/2018 at 2100 Transfer Papers Yes Yes   Sig: Take 1 Tab by mouth nightly. acetaminophen (TYLENOL EXTRA STRENGTH) 500 mg tablet 4/13/2018 at 2050 Transfer Papers Yes Yes   Sig: Take 500 mg by mouth three (3) times daily as needed for Fever (temperature greater than 100F). cephALEXin (KEFLEX) 500 mg capsule 4/13/2018 at 2100 Transfer Papers Yes Yes   Sig: Take 500 mg by mouth four (4) times daily. metFORMIN (GLUCOPHAGE) 500 mg tablet 4/13/2018 at 1700 Transfer Papers Yes Yes   Sig: Take 250 mg by mouth two (2) times daily (with meals). omeprazole (PRILOSEC) 20 mg capsule 4/13/2018 at 1700 Transfer Papers Yes Yes   Sig: Take 20 mg by mouth two (2) times a day. oxybutynin (DITROPAN) 5 mg tablet 4/13/2018 at 1700 Transfer Papers Yes Yes   Sig: Take 5 mg by mouth three (3) times daily. vitamin a & d (A&D) ointment  Transfer Papers Yes Yes   Sig: Apply  to affected area three (3) times daily. Apply to affected area.         Ludivina Son Pharmacist

## 2018-04-14 NOTE — PERIOP NOTES
Order seen in chart from 0530 this morning for a 2D Echo Complete. Echo technician, [employer], paged at [phone]. Per ER staff, Echo is on call for weekend tests.

## 2018-04-14 NOTE — ED NOTES
Transfer Assessment: Patient A&O x4 and in no distress. Physical re-examination reveals no improvement in pts condition with reassessment of vital signs completed at the time of admission transfer. Verification of hospital location [address] and room ER completed with EMS provider [address] [place] [address] Road Team.  Bedside verbal report given to [place].

## 2018-04-14 NOTE — ANESTHESIA POSTPROCEDURE EVALUATION
Post-Anesthesia Evaluation and Assessment    Patient: Cassie Harry MRN: [id number]  SSN: [id number]    YOB: 1950  Age: 69 y.o. Sex: male       Cardiovascular Function/Vital Signs  Visit Vitals    BP 97/65    Pulse 91    Temp 36.6 °C (97.9 °F)    Resp 9    Ht 5' 11\" (1.803 m)    Wt 61.7 kg (136 lb)    SpO2 99%    BMI 18.97 kg/m2       Patient is status post general anesthesia for Procedure(s):  CYSTOSCOPY RIGHT URETERAL STENT INSERTION . Nausea/Vomiting: None    Postoperative hydration reviewed and adequate. Pain:  Pain Scale 1: Numeric (0 - 10) (04/14/18 1151)  Pain Intensity 1: 0 (04/14/18 1151)   Managed    Neurological Status:   Neuro (WDL): Exceptions to WDL (04/14/18 1100)  Neuro  Neurologic State: Drowsy; Pharmacologically induced (comment) (04/14/18 1100)   At baseline    Mental Status and Level of Consciousness: Arousable    Pulmonary Status:   O2 Device: Nasal cannula (04/14/18 1135)   Adequate oxygenation and airway patent    Complications related to anesthesia: None    Post-anesthesia assessment completed.  No concerns    Signed By: Gm Ortega MD     April 14, 2018

## 2018-04-14 NOTE — H&P
[address]  [phone]    Hospitalist Admission Note      NAME:  Ruddy Sol   :   1950   MRN:  [id number]     PCP:  None     Date/Time:  2018 6:00 AM          Subjective:     CHIEF COMPLAINT: fever, diarrhea    HISTORY OF PRESENT ILLNESS:     Mr. Wicho Lee is a 69 y.o. male w/ hx of prostate cancer, diabetes who presents with fevers. Pt is a poor historian. History obtained from discussion with Dr. Maritza Obrien / chart review, and correctional officers. Reportedly had robotic prostatectomy for prostate cancer at [facility] in 2018 and has been re-admitted 2-3 times for UTI, once requiring admission to ICU. Last admission was in March. Has had fevers x ~3 days apparently, with EMS reporting Tmax 103.5. Denies HA, neck stiffness, chest pain, dyspnea, cough, abdominal pain, nausea, vomiting, dysuria. ED workup showed WBC 14K. Cr 1.55 (BL ~1.1), troponin trending up from 1.99 to 3.77. HR was elevated and BP was low. Tmax in ED was 100.2 *(having received Tylenol). Past Medical History:   Diagnosis Date    Diabetes ([address]     Gastrointestinal disorder     GERD    Ill-defined condition     Liver failure        Past Surgical History:   Procedure Laterality Date    ABDOMEN SURGERY PROC UNLISTED      HX PROSTATECTOMY      2018       Social History   Substance Use Topics    Smoking status: Former Smoker    Smokeless tobacco: Never Used    Alcohol use No        History reviewed. No pertinent family history. unknown. No Known Allergies     Prior to Admission medications    Medication Sig Start Date End Date Taking? Authorizing Provider   cephALEXin (KEFLEX) 500 mg capsule Take 500 mg by mouth four (4) times daily. Yes Phys Other, MD   metFORMIN (GLUCOPHAGE) 500 mg tablet Take 250 mg by mouth two (2) times daily (with meals).    Yes Phys Other, MD   omeprazole (PRILOSEC) 20 mg capsule Take 20 mg by mouth two (2) times a day.   Yes King Weston MD   oxybutynin (DITROPAN) 5 mg tablet Take 5 mg by mouth three (3) times daily. Yes King Weston MD   sennosides (SENNA) 8.6 mg cap Take 8.6 mg by mouth. Yes King Weston MD   acetaminophen (TYLENOL EXTRA STRENGTH) 500 mg tablet Take 1,000 mg by mouth every six (6) hours as needed for Pain. Yes King Weston MD       Review of Systems:  (bold if positive, if negative)    Gen:  fever, chills, fatigueEyes:  ENT:  CVS:  Pulm:  GI:  diarrhea  :    MS:  Skin:  Psych:  Endo:    Hem:  Renal:    Neuro:            Objective:      VITALS:    Vital signs reviewed; most recent are:    Visit Vitals    BP 90/62    Pulse (!) 109    Temp 98 °F (36.7 °C)    Resp 17    Ht 5' 11\" (1.803 m)    Wt 61.7 kg (136 lb)    SpO2 98%    BMI 18.97 kg/m2     SpO2 Readings from Last 6 Encounters:   04/14/18 98%          Intake/Output Summary (Last 24 hours) at 04/14/18 0600  Last data filed at 04/14/18 0300   Gross per 24 hour   Intake                0 ml   Output               40 ml   Net              -40 ml            Exam:     Physical Exam:    Gen:  Frail, elderly, ill-appearing  HEENT:  No scleral icterus, hearing intact to voice, dry mucous membranes  Neck:  Supple, without masses. RIJ in place  Resp:  No accessory muscle use. Mildly diminished in bases o/w CTAB without wheezing, rales, rhonchi  Card: tachycardic, reg rhythm. Normal S1 and S2 without murmurs, rubs, or gallops. No peripheral lower extremity edema. No JVD. Peripheral pulses in tact. Abd:  Normoactive bowel sounds. Soft, non-tender, non-distended. No rebound, no guarding. Musc:  No cyanosis or clubbing  Skin:  No rashes or ulcers; turgor intact. Cap refill ~3 secs  Neuro:  Cranial nerves are grossly intact, no focal motor weakness, follows commands appropriately  Psych:  Limited insight, normal affect. Alert, oriented x 3.  Answers questions appropriately       Labs:    Recent Labs      04/14/18   0434   WBC  14.8*   HGB  9.6*   HCT 29.6*   PLT  402*     Recent Labs      04/14/18   0434  04/13/18   2333   NA  137  134*   K  4.1  4.1   CL  102  97   CO2  19*  24   GLU  123*  140*   BUN  19  22*   CREA  1.40*  1.55*   CA  7.0*  8.6   MG  1.4*   --    ALB   --   2.6*   SGOT   --   20   ALT   --   18     No components found for: [place]  Recent Labs      04/14/18   0545   PH  7.47*   PCO2  23*   PO2  78*   HCO3  16*   FIO2  21     Recent Labs      04/13/18   2333   INR  1.2*     No results found for: SDES  No results found for: CULT  All other current labs reviewed in the computer. Imaging/Studies:    CXR: Clear lungs. Indeterminate retrocardiac double density. This may represent a  hiatal hernia. CT chest could confirm this  Imaging personally reviewed    EKG: sinus tach, LAD, NSSTT changes  EKG personally reviewed         Assessment / Plan:         Shock: presumed septic as he was febrile to 103.5. Likely source as UTI however UA bland. May have been on antibiotics as Keflex is listed as PTA med. Also reports diarrhea, at risk for C. Diff. Per verbal report, \"last U/C at [facility] grew proteus sensitive to Rocephin and MRSA\". Given IV CTX in ED, will broaden with Zosyn. Add vancomycin. Given reports of diarrhea, add Flagyl pending C. Diff (would de-escalate Zosyn soon). Follow blood and urine cultures closely. Will check CT c/a/p to rule out other acute findings. Will avoid IV contrast for now given AGUEDA, especially if he may get [facility] for his NSTEMI. IV boluses given in ED, continue IVF. Start vasopressor to keep MAP >65. Check TTE. RapidFlu negative. Send URI viral panel. Obtain records from [facility]. Elevated troponin: type 2 MI, in the setting of shock. Cardiology recommends starting on IV heparin gtt. Trend troponin. Continue ASA. Start atorvastatin. Check FLP. TTE       AGUEDA (acute kidney injury) [facility]): with high anion gap metabolic acidosis. Lactate WNR. Likely due to sepsis/shock. Continue IVF. Nephrology consult. Repeat BMP later today.  ABG shows compensation (respiratory alkalosis). Diabetes ([address]): type 2. On metformin PTA which we will hold with gap acidosis. Cover with SSI. Anemia: worse after hydration, suspect he has ACD. Monitor closely on IV heparin. Check iron panel, ferritin. Prostate CA: s/p robotic prostatectomy for prostate cancer at [address] [place] [address] in Jan 2018. CT a/p as above    Code Status: FULL     Surrogate decision maker: incarcerated       ED notes and lab results reviewed.        Total time spent with patient: 79 Minutes, critical care    Risk of deterioration: High                 Care Plan discussed with: ED provider, Patient, Nursing Staff and >50% of time spent in counseling and coordination of care    Discussed:  Care Plan    Prophylaxis:  heparin gtt    Disposition:  incarcerated       ___________________________________________________    Attending Physician: Robert Kaur MD

## 2018-04-14 NOTE — CONSULTS
Asked to see in regards to Critical Care management    Impression:  1. Urosepsis due to obstruction  s/p stent removal and drainage of pus  2. Doing well post op. Off pressors  3. If he remains stable in the PACU for the next 3 to 4 hours he can be down graded to step down/tele if OK with Dr. Samantha Cohen and Sergio Melgar.     Dictated

## 2018-04-14 NOTE — ED NOTES
Bedside shift change report given to Bebo Kim RN (oncoming nurse) by Rosa Fu RN (offgoing nurse). Report included the following information SBAR, ED Summary, MAR and Recent Results.

## 2018-04-14 NOTE — ED NOTES
Tylenol held per Dr Maritza Obrien.   Per [facility] patient's last dose of Tylenol 1000 mg was at 4/13/18 20:50

## 2018-04-14 NOTE — ED NOTES
CHG bath prep completed and preop checklist completed. Bedside report given to ED from OR including recent Troponin result, Next time of PTT due which is 11:40 AM. MAR including Heparin gtt and Levophed gtt.

## 2018-04-14 NOTE — ED PROVIDER NOTES
HPI Comments: The patient presents to the ED with fever. He is a very poor historian. He notes he has hx robotic prostatectomy for prostate cancer at [facility] in Jan 2018. He has been re-admitted 2-3 times for UTI - once to ICU. Last admission was in March. He developed fever a few days ago. He is not sure how high his fever has been. EMS notes fever 103.5F. He denies any headache or neck stiffness. He has no rhinorrhea, congestion, or cough. He denies any n/v/d or abdominal pain. Last BM was this AM and normal. He does note decreased urine output today. He has not had dysuria. He denies any sores or wounds on his body. He has not been eating or drinking much as he is now on the infirmary at [facility] and unable to purchase his own food. He was given Tylenol at 2050. He also had hiccups yesterday and this AM. He has not had any shortness of breath or chest pain. Patient is a 69 y.o. male presenting with fever. The history is provided by the patient ([facility] guards. ). Fever    Pertinent negatives include no chest pain, no diarrhea, no vomiting, no congestion, no headaches, no sore throat, no cough, no shortness of breath and no rash. Past Medical History:   Diagnosis Date    Diabetes ([address]     Gastrointestinal disorder     GERD    Ill-defined condition     Liver failure       Past Surgical History:   Procedure Laterality Date    ABDOMEN SURGERY PROC UNLISTED      HX PROSTATECTOMY      Jan 2018         History reviewed. No pertinent family history. Social History     Social History    Marital status: SINGLE     Spouse name: N/A    Number of children: N/A    Years of education: N/A     Occupational History    Not on file.      Social History Main Topics    Smoking status: Former Smoker    Smokeless tobacco: Never Used    Alcohol use No    Drug use: No    Sexual activity: Not on file     Other Topics Concern    Not on file     Social History Narrative    No narrative on file         ALLERGIES: Review of patient's allergies indicates no known allergies. Review of Systems   Constitutional: Positive for chills and fever. Negative for appetite change. HENT: Negative for congestion, nosebleeds and sore throat. Eyes: Negative for pain and discharge. Respiratory: Negative for cough, shortness of breath, wheezing and stridor. Cardiovascular: Negative for chest pain. Gastrointestinal: Negative for abdominal pain, diarrhea, nausea and vomiting. Genitourinary: Positive for decreased urine volume and difficulty urinating. Negative for discharge, dysuria, flank pain, frequency, genital sores, penile pain, penile swelling, scrotal swelling, testicular pain and urgency. Musculoskeletal: Negative. Skin: Negative for rash. Neurological: Negative for weakness and headaches. Hematological: Negative for adenopathy. Psychiatric/Behavioral: Negative. All other systems reviewed and are negative. Vitals:    04/14/18 0030 04/14/18 0100 04/14/18 0115 04/14/18 0145   BP: 113/53 94/51 99/50 118/68   Pulse: (!) 132 (!) 124 (!) 122 (!) 124   Resp: 23 27 23 23   Temp:       SpO2: 97% 95% 94% 96%   Weight:       Height:                Physical Exam   Constitutional: He is oriented to person, place, and time. He appears well-developed and well-nourished. Thin. Chronically ill appearing. HENT:   Head: Normocephalic and atraumatic. Mouth/Throat: Oropharynx is clear and moist.   Eyes: Conjunctivae are normal.   Neck: Normal range of motion. Neck supple. Cardiovascular: Regular rhythm and normal heart sounds. Tachycardia    Pulmonary/Chest: Effort normal and breath sounds normal.   Abdominal: Soft. Bowel sounds are normal. He exhibits no distension. There is no tenderness. Musculoskeletal: Normal range of motion. He exhibits no edema or tenderness. Neurological: He is alert and oriented to person, place, and time. Skin: Skin is warm and dry. Psychiatric: He has a normal mood and affect. His behavior is normal.   Nursing note and vitals reviewed. MDM  Number of Diagnoses or Management Options  Elevated troponin:   Sepsis, due to unspecified organism [facility]):   Critical Care  Total time providing critical care:  minutes (80  minutes)        ED Course       Central Line  Performed by: Jayna Pro  Authorized by: Jayna Pro     Consent:     Consent obtained:  Written    Consent given by:  Patient    Risks discussed:  Arterial puncture, incorrect placement, nerve damage, pneumothorax, infection and bleeding    Alternatives discussed:  Delayed treatment, alternative treatment and observation  Pre-procedure details:     Hand hygiene: Hand hygiene performed prior to insertion      Sterile barrier technique: All elements of maximal sterile technique followed      Skin preparation:  2% chlorhexidine    Skin preparation agent: Skin preparation agent completely dried prior to procedure    Anesthesia (see MAR for exact dosages): Anesthesia method:  Local infiltration    Local anesthetic:  Lidocaine 1% w/o epi  Procedure details:     Location:  R internal jugular    Site selection rationale:  U/S    Patient position:  Trendelenburg    Procedural supplies:  Triple lumen    Landmarks identified: yes      Ultrasound guidance: yes      Sterile ultrasound techniques: Sterile gel and sterile probe covers were used      Number of attempts:  1    Successful placement: yes    Post-procedure details:     Post-procedure:  Dressing applied and line sutured    Assessment:  Blood return through all ports, no pneumothorax on x-ray, placement verified by x-ray and free fluid flow    Patient tolerance of procedure: Tolerated well, no immediate complications          ED EKG interpretation:  Rhythm: sinus tachycardia; and regular . Rate (approx.): 103; Axis: left axis deviation; P wave: normal; QRS interval: normal ; ST/T wave: non-specific changes;  This EKG was interpreted by Seema Rajan MD,ED Provider. ED EKG interpretation:  Rhythm: sinus tachycardia; and regular . Rate (approx.): 120; Axis: left axis deviation; P wave: normal; QRS interval: normal ; ST/T wave: non-specific changes; This EKG was interpreted by Orlando Jerome MD,ED Provider. CONSULT:  Dr. Uday Le - [facility] - Notes given hx and treatment at [facility], he recommends transfer to [facility] for continuity of care. CONSULT:  Dr. Alfonso Cain hospitalist - notes baseline Cr 1. Last U/C grew proteus sensitive to Rocephin and MRSA. She will look for bed. CONSULT:  Dr. Roderick Dias - informed of elevated troponin. She would like repeat troponin at 2 AM and repeat EKG and to be contacted with the results. She will accept in transfer. CONSULT:  Dr. Ainsley Victor hospitalist - as troponin is rising, he will need ICU bed. No ICU beds at [facility] and >20 patients holding in the ED. CONSULT:  Dr. Uday Le - accepts in transfer. Would like CT chest PE and CT abd/pel with IV contrast.    CONSULT:  Dr. Jj Platt - radiology - does not want patient to receive IV contrast.    CONSULT:  Dr. Payal Streeter - cardiology - he has reviewed EKGs. He would like the patient to receive heparin and will see in the [place]  Dr. Hilda Weller - ED at [facility] - informed the patient will boarding in ED. CTs to be done at [facility] and heparin will be started there as PTT pending at transfer. A/P:  1. Septic shock - concern for UTI. Treated with Vancomycin and Rocephin based on prior cultures. 2. Elevated troponin - no CP or SOB. Did have hiccups yesterday.

## 2018-04-14 NOTE — ED NOTES
[facility] communication provided and informed of purposeful rounding to include collaboration of entire care team; patient acknowledged understanding. Pt given pillow and blanket for comfort. 2 officers at bedside.

## 2018-04-14 NOTE — CONSULTS
[address]  YOB: 1950     Assessment & Plan:   1. AGUEDA vs CKD  · Cr 1.4 mg here  · Unknown baseline  · This ma get better with the the treatment of shock,sepsis and rt stent  · ivf  · abx  2. Septic Shock  · Urine likely  · Antibiotics,ivf,pressures  3. Hydro with stone  · S/p stent  4. DM  5. ANEMIA  6. H/O CA Prostate                   Subjective:   CHIEF COMPLAIN:AK  HPI:  69 YRS OLD AAM Seen for AGUEDA. Cr has been around 1.4 mg here  He usually goes to [facility] for care. He is here with Septic shock and urine is the source. He underwent cysto and rt stent placement  He has dyson, making urine. He has DM,Dyslipidemia,CA Prostate. Review of Systems  Review of systems not obtained due to patient factors. Past Medical History:   Diagnosis Date    Diabetes ([facility] [address]     Gastrointestinal disorder     GERD    Ill-defined condition     Liver failure      Past Surgical History:   Procedure Laterality Date    ABDOMEN SURGERY PROC UNLISTED      HX PROSTATECTOMY      Jan 2018       Social History     Social History    Marital status: SINGLE     Spouse name: N/A    Number of children: N/A    Years of education: N/A     Occupational History    Not on file. Social History Main Topics    Smoking status: Former Smoker    Smokeless tobacco: Never Used    Alcohol use No    Drug use: No    Sexual activity: Not on file     Other Topics Concern    Not on file     Social History Narrative      History reviewed. No pertinent family history. Prior to Admission medications    Medication Sig Start Date End Date Taking? Authorizing Provider   SENNOSIDES/DOCUSATE SODIUM (SENNA-S PO) Take 1 Tab by mouth nightly. Yes Historical Provider   vitamin a & d (A&D) ointment Apply  to affected area three (3) times daily. Apply to affected area.    Yes Historical Provider   METHYL SALICYLATE/MENTHOL (MATIAS ROBBINS EX) Apply  to affected area two (2) times daily as needed. Yes Historical Provider   cephALEXin (KEFLEX) 500 mg capsule Take 500 mg by mouth four (4) times daily. 18 Yes King Weston MD   metFORMIN (GLUCOPHAGE) 500 mg tablet Take 250 mg by mouth two (2) times daily (with meals). Yes King Weston MD   omeprazole (PRILOSEC) 20 mg capsule Take 20 mg by mouth two (2) times a day. Yes King Weston MD   oxybutynin (DITROPAN) 5 mg tablet Take 5 mg by mouth three (3) times daily. Yes King Weston MD   acetaminophen (TYLENOL EXTRA STRENGTH) 500 mg tablet Take 500 mg by mouth three (3) times daily as needed for Fever (temperature greater than 100F). Yes King Weston MD     No Known Allergies    Objective:     Vitals:  Blood pressure 92/58, pulse 89, temperature 97.9 °F (36.6 °C), resp. rate 16, height 5' 11\" (1.803 m), weight 61.7 kg (136 lb), SpO2 98 %.   Temp (24hrs), Av.4 °F (36.9 °C), Min:97.9 °F (36.6 °C), Max:100.2 °F (37.9 °C)      Intake and Output:   07 -  1900  In: 850 [I.V.:850]  Out: 5    1901 -  0700  In: -   Out: 40 [Urine:40]    Physical Exam:                Patient is intubated:  no    Physical Examination:   GENERAL ASSESSMENT: cachetic  SKIN: normal color, no lesions and dry skin  HEAD: normocephalic  EYES: normal eyes  NECK: normal and supple full range of motion  CHEST: normal air exchange, no rales, no rhonchi, no wheezes, respiratory effort normal with no retractions  HEART: regular rate and rhythm, normal S1/S2, no murmurs  ABDOMEN: soft, no masses, no hepatosplenomegaly  :[place]: yes  EXTREMITY: no joint swelling  NEURO: SEDATED      ECG/rhythm[de-identified] Rev:yes  Xray/CT/US/MRI REV:yes  Data Review   Recent Results (from the past 72 hour(s))   LACTIC ACID    Collection Time: 18 11:33 PM   Result Value Ref Range    Lactic acid 1.1 0.4 - 2.0 MMOL/L   CBC WITH AUTOMATED DIFF    Collection Time: 18 11:33 PM   Result Value Ref Range    WBC 13.6 (H) 4.1 - 11.1 K/uL    RBC 4.42 4.10 - 5.70 M/uL    HGB 11.2 (L) 12.1 - 17.0 g/dL    HCT 34.3 (L) 36.6 - 50.3 %    MCV 77.6 (L) 80.0 - 99.0 FL    MCH 25.3 (L) 26.0 - 34.0 PG    MCHC 32.7 30.0 - 36.5 g/dL    RDW 16.3 (H) 11.5 - 14.5 %    PLATELET 506 (H) 327 - 400 K/uL    MPV 8.9 8.9 - 12.9 FL    NEUTROPHILS 90 (H) 32 - 75 %    LYMPHOCYTES 5 (L) 12 - 49 %    MONOCYTES 5 5 - 13 %    EOSINOPHILS 0 0 - 7 %    BASOPHILS 0 0 - 1 %    ABS. NEUTROPHILS 12.2 (H) 1.8 - 8.0 K/UL    ABS. LYMPHOCYTES 0.7 K/UL    ABS. MONOCYTES 0.7 0.0 - 1.0 K/UL    ABS. EOSINOPHILS 0.0 0.0 - 0.4 K/UL    ABS. BASOPHILS 0.0 0.0 - 0.1 K/UL    DF SMEAR SCANNED      RBC COMMENTS ANISOCYTOSIS  1+        RBC COMMENTS MICROCYTOSIS  1+        XXWBCSUS 0     METABOLIC PANEL, COMPREHENSIVE    Collection Time: 04/13/18 11:33 PM   Result Value Ref Range    Sodium 134 (L) 136 - 145 mmol/L    Potassium 4.1 3.5 - 5.1 mmol/L    Chloride 97 97 - 108 mmol/L    CO2 24 21 - 32 mmol/L    Anion gap 13 5 - 15 mmol/L    Glucose 140 (H) 65 - 100 mg/dL    BUN 22 (H) 6 - 20 MG/DL    Creatinine 1.55 (H) 0.70 - 1.30 MG/DL    BUN/Creatinine ratio 14 12 - 20      GFR est AA 54 (L) >60 ml/min/1.73m2    GFR est non-AA 45 (L) >60 ml/min/1.73m2    Calcium 8.6 8.5 - 10.1 MG/DL    Bilirubin, total 0.9 0.2 - 1.0 MG/DL    ALT (SGPT) 18 12 - 78 U/L    AST (SGOT) 20 15 - 37 U/L    Alk.  phosphatase 99 45 - 117 U/L    Protein, total 7.6 6.4 - 8.2 g/dL    Albumin 2.6 (L) 3.5 - 5.0 g/dL    Globulin 5.0 (H) 2.0 - 4.0 g/dL    A-G Ratio 0.5 (L) 1.1 - 2.2     PROTHROMBIN TIME + INR    Collection Time: 04/13/18 11:33 PM   Result Value Ref Range    INR 1.2 (H) 0.9 - 1.1      Prothrombin time 12.0 (H) 9.0 - 11.1 sec   TROPONIN I    Collection Time: 04/13/18 11:33 PM   Result Value Ref Range    Troponin-I, Qt. 1.99 (H) <0.08 ng/mL   EKG, 12 LEAD, INITIAL    Collection Time: 04/13/18 11:57 PM   Result Value Ref Range    Ventricular Rate 103 BPM    Atrial Rate 103 BPM    P-R Interval 150 ms    QRS Duration 80 ms    Q-T Interval 348 ms    QTC Calculation (Bezet) 455 ms    Calculated P Axis 56 degrees    Calculated R Axis -37 degrees    Calculated T Axis 57 degrees    Diagnosis       Sinus tachycardia  Left axis deviation  Abnormal ECG  No previous ECGs available     EKG, 12 LEAD, SUBSEQUENT    Collection Time: 04/14/18  2:10 AM   Result Value Ref Range    Ventricular Rate 120 BPM    Atrial Rate 120 BPM    P-R Interval 154 ms    QRS Duration 84 ms    Q-T Interval 324 ms    QTC Calculation (Bezet) 457 ms    Calculated P Axis 53 degrees    Calculated R Axis -36 degrees    Calculated T Axis 59 degrees    Diagnosis       Sinus tachycardia  Left axis deviation  Abnormal ECG  When compared with ECG of 13-APR-2018 23:57,  MANUAL COMPARISON REQUIRED, DATA IS UNCONFIRMED     TROPONIN I    Collection Time: 04/14/18  2:15 AM   Result Value Ref Range    Troponin-I, Qt. 3.77 (H) <0.08 ng/mL   URINALYSIS W/ REFLEX CULTURE    Collection Time: 04/14/18  3:09 AM   Result Value Ref Range    Color YELLOW/STRAW      Appearance CLEAR CLEAR      Specific gravity 1.020 1.003 - 1.030      pH (UA) 6.0 5.0 - 8.0      Protein 100 (A) NEG mg/dL    Glucose NEGATIVE  NEG mg/dL    Ketone 15 (A) NEG mg/dL    Blood TRACE (A) NEG      Urobilinogen 0.2 0.2 - 1.0 EU/dL    Nitrites NEGATIVE  NEG      Leukocyte Esterase NEGATIVE  NEG      WBC 5-10 0 - 4 /hpf    RBC 0-5 0 - 5 /hpf    Epithelial cells FEW FEW /lpf    Bacteria 1+ (A) NEG /hpf    UA:UC IF INDICATED URINE CULTURE ORDERED (A) CNI      Amorphous Crystals 2+ (A) NEG   BILIRUBIN, CONFIRM    Collection Time: 04/14/18  3:09 AM   Result Value Ref Range    Bilirubin UA, confirm NEGATIVE  NEG     LACTIC ACID    Collection Time: 04/14/18  3:26 AM   Result Value Ref Range    Lactic acid 1.0 0.4 - 2.0 MMOL/L   INFLUENZA A & B AG (RAPID TEST)    Collection Time: 04/14/18  3:53 AM   Result Value Ref Range    Influenza A Antigen NEGATIVE  NEG      Influenza B Antigen NEGATIVE  NEG     MAGNESIUM    Collection Time: 04/14/18  4:34 AM   Result Value Ref Range Magnesium 1.4 (L) 1.6 - 2.4 mg/dL   PTT    Collection Time: 04/14/18  4:34 AM   Result Value Ref Range    aPTT 40.5 (H) 22.1 - 32.0 sec    aPTT, therapeutic range     58.0 - 77.0 SECS   CBC WITH AUTOMATED DIFF    Collection Time: 04/14/18  4:34 AM   Result Value Ref Range    WBC 14.8 (H) 4.1 - 11.1 K/uL    RBC 3.76 (L) 4.10 - 5.70 M/uL    HGB 9.6 (L) 12.1 - 17.0 g/dL    HCT 29.6 (L) 36.6 - 50.3 %    MCV 78.7 (L) 80.0 - 99.0 FL    MCH 25.5 (L) 26.0 - 34.0 PG    MCHC 32.4 30.0 - 36.5 g/dL    RDW 16.3 (H) 11.5 - 14.5 %    PLATELET 492 (H) 946 - 400 K/uL    MPV 9.3 8.9 - 12.9 FL    NEUTROPHILS 88 (H) 32 - 75 %    LYMPHOCYTES 6 (L) 12 - 49 %    MONOCYTES 6 5 - 13 %    EOSINOPHILS 0 0 - 7 %    BASOPHILS 0 0 - 1 %    ABS. NEUTROPHILS 13.1 (H) 1.8 - 8.0 K/UL    ABS. LYMPHOCYTES 0.9 K/UL    ABS. MONOCYTES 0.8 0.0 - 1.0 K/UL    ABS. EOSINOPHILS 0.0 0.0 - 0.4 K/UL    ABS.  BASOPHILS 0.0 0.0 - 0.1 K/UL    DF AUTOMATED      XXWBCSUS 0     METABOLIC PANEL, BASIC    Collection Time: 04/14/18  4:34 AM   Result Value Ref Range    Sodium 137 136 - 145 mmol/L    Potassium 4.1 3.5 - 5.1 mmol/L    Chloride 102 97 - 108 mmol/L    CO2 19 (L) 21 - 32 mmol/L    Anion gap 16 (H) 5 - 15 mmol/L    Glucose 123 (H) 65 - 100 mg/dL    BUN 19 6 - 20 MG/DL    Creatinine 1.40 (H) 0.70 - 1.30 MG/DL    BUN/Creatinine ratio 14 12 - 20      GFR est AA >60 >60 ml/min/1.73m2    GFR est non-AA 50 (L) >60 ml/min/1.73m2    Calcium 7.0 (L) 8.5 - 10.1 MG/DL   LIPID PANEL    Collection Time: 04/14/18  4:34 AM   Result Value Ref Range    LIPID PROFILE          Cholesterol, total 128 <200 MG/DL    Triglyceride 112 <150 MG/DL    HDL Cholesterol 29 MG/DL    LDL, calculated 76.6 0 - 100 MG/DL    VLDL, calculated 22.4 MG/DL    CHOL/HDL Ratio 4.4 0 - 5.0     BLOOD GAS, ARTERIAL    Collection Time: 04/14/18  5:45 AM   Result Value Ref Range    pH 7.47 (H) 7.35 - 7.45      PCO2 23 (L) 35.0 - 45.0 mmHg    PO2 78 (L) 80 - 100 mmHg    O2 SAT 97 92 - 97 %    BICARBONATE 16 (L) 22 - 26 mmol/L    BASE DEFICIT 5.6 mmol/L    O2 METHOD ROOM AIR      FIO2 21 %    SPONTANEOUS RATE 24.0      Sample source ARTERIAL      SITE LEFT RADIAL      MIKO'S TEST YES      Critical value read back Aiyln Santamaria MD.    GLUCOSE, POC    Collection Time: 04/14/18  7:25 AM   Result Value Ref Range    Glucose (POC) 130 (H) 65 - 100 mg/dL    Performed by Colon Loose    RESPIRATORY PANEL,PCR,NASOPHARYNGEAL    Collection Time: 04/14/18  8:41 AM   Result Value Ref Range    Adenovirus NOT DETECTED NOTD      Coronavirus 229E NOT DETECTED NOTD      Coronavirus HKU1 NOT DETECTED NOTD      Coronavirus CVNL63 NOT DETECTED NOTD      Coronavirus OC43 NOT DETECTED NOTD      Metapneumovirus NOT DETECTED NOTD      Rhinovirus and Enterovirus NOT DETECTED NOTD      Influenza A NOT DETECTED NOTD      Influenza A, subtype H1 NOT DETECTED NOTD      Influenza A, subtype H3 NOT DETECTED NOTD      INFLUENZA A H1N1 PCR NOT DETECTED NOTD      Influenza B NOT DETECTED NOTD      Parainfluenza 1 NOT DETECTED NOTD      Parainfluenza 2 NOT DETECTED NOTD      Parainfluenza 3 NOT DETECTED NOTD      Parainfluenza virus 4 NOT DETECTED NOTD      RSV by PCR NOT DETECTED NOTD      Bordetella pertussis - PCR NOT DETECTED NOTD      Chlamydophila pneumoniae DNA, QL, PCR NOT DETECTED NOTD      Mycoplasma pneumoniae DNA, QL, PCR NOT DETECTED NOTD     METABOLIC PANEL, BASIC    Collection Time: 04/14/18  8:57 AM   Result Value Ref Range    Sodium 137 136 - 145 mmol/L    Potassium 4.0 3.5 - 5.1 mmol/L    Chloride 103 97 - 108 mmol/L    CO2 19 (L) 21 - 32 mmol/L    Anion gap 15 5 - 15 mmol/L    Glucose 138 (H) 65 - 100 mg/dL    BUN 18 6 - 20 MG/DL    Creatinine 1.43 (H) 0.70 - 1.30 MG/DL    BUN/Creatinine ratio 13 12 - 20      GFR est AA 59 (L) >60 ml/min/1.73m2    GFR est non-AA 49 (L) >60 ml/min/1.73m2    Calcium 7.4 (L) 8.5 - 10.1 MG/DL   TROPONIN I    Collection Time: 04/14/18  8:57 AM   Result Value Ref Range    Troponin-I, Qt. 3.15 (H) <0.05 ng/mL   GLUCOSE, POC    Collection Time: 04/14/18 11:06 AM   Result Value Ref Range    Glucose (POC) 126 (H) 65 - 100 mg/dL    Performed by Amy Baron        Discussed with:    Nurse  Thank you so much to allow us to participate in this patient's care. We will follow.  : Gracie Manzanares MD  4/14/2018      [facility]:  [email or web address] Direct  Humera Mace office:  [address], [address] [place] [address],[address]  [place], [address] [place] [address]  Phone: [phone]  Fax :     [phone]    [place] office:  [address] [facility], [address] [place] [address]  Phone - [phone]  Fax - [phone]

## 2018-04-14 NOTE — ANESTHESIA PREPROCEDURE EVALUATION
Anesthetic History   No history of anesthetic complications            Review of Systems / Medical History  Patient summary reviewed, nursing notes reviewed and pertinent labs reviewed    Pulmonary  Within defined limits                 Neuro/Psych   Within defined limits           Cardiovascular  Within defined limits                Exercise tolerance: >4 METS  Comments: Elevated troponin, ? NSTEMI, CT chest shows significant calcification of RCA and LAD, on heparin, being stopped for procedure.     Septic shock, fluid boluses, levophed 5mcg/min   GI/Hepatic/Renal     GERD      Hiatal hernia     Endo/Other    Diabetes    Anemia (hg=9.6)     Other Findings            Physical Exam    Airway  Mallampati: II  TM Distance: 4 - 6 cm  Neck ROM: normal range of motion   Mouth opening: Normal     Cardiovascular    Rhythm: regular  Rate: normal         Dental    Dentition: Poor dentition     Pulmonary  Breath sounds clear to auscultation               Abdominal         Other Findings            Anesthetic Plan    ASA: 3, emergent  Anesthesia type: general          Induction: Intravenous  Anesthetic plan and risks discussed with: Patient

## 2018-04-14 NOTE — PROGRESS NOTES
[facility] Dosing Services: Antimicrobial Stewardship Progress Note    Consult for antibiotic dosing of Vancomycin by Dr. Flo Flowers  Pharmacist reviewed antibiotic appropriateness for 69year old , male  for indication of Sepsis, unclear source  Day of Therapy 1    Plan:  Vancomycin therapy:  Start Vancomycin therapy, with loading dose of 1500 (mg) at 0200 4/14/18. Follow with maintenance dose of : by random level. Baseline scr is 1 per MCV. (addendum 0630: CrCl now 44 ml/min, will schedule vancomycin at 1000 mg IV q24H)    Dose calculated to approximate a therapeutic trough of 15-20 mcg/mL. Last trough level / Plan for level:   Pharmacy to follow daily and will make changes to dose and/or frequency based on clinical status. Non-Kinetic Antimicrobial Dosing:   Current Regimen:    Recommendation:   Other Antimicrobial  (not dosed by pharmacist)   Zosyn   Cultures     Dr. Wei Lucero hospitalist - notes baseline Cr 1. Last U/C grew proteus sensitive to Rocephin and MRSA. Serum Creatinine     Lab Results   Component Value Date/Time    Creatinine 1.55 (H) 04/13/2018 11:33 PM       Creatinine Clearance Estimated Creatinine Clearance: 39.3 mL/min (based on Cr of 1.55).      Temp   97.9 °F (36.6 °C)    WBC   Lab Results   Component Value Date/Time    WBC 13.6 (H) 04/13/2018 11:33 PM       H/H   Lab Results   Component Value Date/Time    HGB 11.2 (L) 04/13/2018 11:33 PM        Platelets   Lab Results   Component Value Date/Time    PLATELET 411 (H) 78/60/8635 11:33 PM          Pharmacist: Nicolette information: [phone]

## 2018-04-14 NOTE — ED NOTES
Code Purple SBAR    SIRS Criteria WBC >12 or <4, HR >90 bpm  Mental Status Level of Consciousness: Alert    Labs/Lactic Acid Drawn at:   0016, Lactate 1 result: 1.6    Fluid resuscitation total 3000mL Completed    Antibiotics Hanging?  YES    Visit Vitals    BP (!) 85/55 (BP 1 Location: Left arm, BP Patient Position: At rest)    Pulse (!) 116    Temp 98.2 °F (36.8 °C)    Resp 23    Ht 5' 11\" (1.803 m)    Wt 61.8 kg (136 lb 3.9 oz)    SpO2 97%    BMI 19 kg/m2

## 2018-04-14 NOTE — PROGRESS NOTES
[address] Peralta [address]ill[address]  [address] [facility], [address] [place] [address]  [phone]      Medical Progress Note      NAME:         Hien Munoz   :        1950  MRM:        [id number]    Date:          2018      Subjective: Patient has been seen and examined as a follow up for septic shock. Chart, labs, diagnostics reviewed. Seen post operatively. Still mildly sedated. Discussed with his nurse for collaborative hx. No fever. Objective:    Vital Signs:    Visit Vitals    BP 98/65    Pulse 90    Temp 97.9 °F (36.6 °C)    Resp 10    Ht 5' 11\" (1.803 m)    Wt 61.7 kg (136 lb)    SpO2 100%    BMI 18.97 kg/m2        Intake/Output Summary (Last 24 hours) at 18 1224  Last data filed at 18 1108   Gross per 24 hour   Intake              850 ml   Output               45 ml   Net              805 ml        Physical Examination:    General:   Weak looking and not in much acute distress   Eyes:   pink conjunctivae, PERRLA with no discharge. ENT:   no ottorrhea or rhinorrhea with moist mucous membranes  Neck: no masses, thyroid non-tender and trachea central.  Pulm:  clear breath sounds without crackles or wheezes  Card:  no JVD or murmurs, has regular and normal S1, S2 without thrills, bruits or peripheral edema  Abd:  Soft, non-tender, non-distended, normoactive bowel sounds with no palpable organomegaly  Musc:  No cyanosis, clubbing, atrophy or deformities. Skin:  No rashes, bruising or ulcers. Neuro: Awake and alert but lapses into sleep.  Grossly non focal.     Current Facility-Administered Medications   Medication Dose Route Frequency    cefTRIAXone (ROCEPHIN) 1 gram injection        ADDaptor        sodium chloride (NS) flush 5-10 mL  5-10 mL IntraVENous Q8H    sodium chloride (NS) flush 5-10 mL  5-10 mL IntraVENous PRN    naloxone (NARCAN) injection 0.4 mg  0.4 mg IntraVENous PRN    NOREPINephrine (LEVOPHED) 4 mg in dextrose 5% 250 mL infusion  2-16 mcg/min IntraVENous TITRATE    heparin 25,000 units in D5W 250 ml infusion  12-25 Units/kg/hr IntraVENous TITRATE    metroNIDAZOLE (FLAGYL) IVPB premix 500 mg  500 mg IntraVENous Q6H    0.9% sodium chloride infusion  100 mL/hr IntraVENous CONTINUOUS    insulin lispro (HUMALOG) injection   SubCUTAneous TIDAC    glucose chewable tablet 16 g  4 Tab Oral PRN    dextrose (D50W) injection syrg 12.5-25 g  12.5-25 g IntraVENous PRN    glucagon (GLUCAGEN) injection 1 mg  1 mg IntraMUSCular PRN    [START ON 4/15/2018] aspirin delayed-release tablet 81 mg  81 mg Oral DAILY    atorvastatin (LIPITOR) tablet 40 mg  40 mg Oral QHS    piperacillin-tazobactam (ZOSYN) 3.375 g in 0.9% sodium chloride (MBP/ADV) 100 mL  3.375 g IntraVENous Q8H    [START ON 4/15/2018] vancomycin (VANCOCIN) 1,000 mg in 0.9% sodium chloride (MBP/ADV) 250 mL  1,000 mg IntraVENous Q24H    sodium chloride (NS) flush 5-10 mL  5-10 mL IntraVENous Q8H    sodium chloride (NS) flush 5-10 mL  5-10 mL IntraVENous PRN    lidocaine (PF) (XYLOCAINE) 10 mg/mL (1 %) injection 0.1 mL  0.1 mL SubCUTAneous PRN    lactated Ringers infusion  125 mL/hr IntraVENous CONTINUOUS    sodium chloride (NS) flush 5-10 mL  5-10 mL IntraVENous PRN    HYDROmorphone (PF) (DILAUDID) injection 0.26-1 mg  0.26-1 mg IntraVENous Q10MIN PRN    ondansetron (ZOFRAN) injection 4 mg  4 mg IntraVENous PRN    diphenhydrAMINE (BENADRYL) injection 12.5 mg  12.5 mg IntraVENous PRN        Laboratory data and review:    Recent Labs      04/14/18   0434  04/13/18   2333   WBC  14.8*  13.6*   HGB  9.6*  11.2*   HCT  29.6*  34.3*   PLT  402*  459*     Recent Labs      04/14/18   0857  04/14/18   0434  04/13/18   2333   NA  137  137  134*   K  4.0  4.1  4.1   CL  103  102  97   CO2  19*  19*  24   GLU  138*  123*  140*   BUN  18  19  22*   CREA  1.43*  1.40*  1.55*   CA  7.4*  7.0*  8.6   MG   --   1.4*   --    ALB   --    --   2.6*   SGOT --    --   20   ALT   --    --   18   INR   --    --   1.2*     No components found for: [place]    Other Diagnostics:    Telemetry reviewed:   normal sinus rhythm    Assessment and Plan:    Septic shock ([facility] (4/14/2018): presumed of urinary tract source. Continue empiric IV antibiotics. Follow cultures. Give a saline fluid bolus and wean off Levophed. Hydronephrosis with renal and ureteral calculous obstruction (4/14/2018): noted on CT abdomen. Seen by urology and he is sp cystoscopy and right retrograde pyelogram, and right double J stent placement. Urology following. Hydrate    AGUEDA (acute kidney injury) ([facility] [address] [facility] (4/14/2018)/ High anion gap metabolic acidosis (6/53/4199): suspect from septic shock, has a hx of prostate ca and now ureteric stone. Continue IVFs and follow renal function    Elevated troponin (4/14/2018): type 2 MI, in the setting of shock. Continue empiric IV heparin gtt. Trend troponin. Continue ASA, atorvastatin. Follow Echo      Type 2 diabetes mellitus without complication ([facility] (4/81/6797): monitor blood glucose. SSI per protocol for now. Hold metformin    [facility] (4/14/2018): incidental on CT. Asymptomatic. Anemia: worse after hydration, suspect he has ACD. Monitor       Prostate CA: s/p robotic prostatectomy for prostate cancer at [address] [place] [address] in Jan 2018.  Monitor     Total time spent for the [facility]'s care: [facility] discussed with: Patient and Nursing Staff    Discussed:  Care Plan    Prophylaxis:  SCD's    Anticipated Disposition:  Correctional facility           ___________________________________________________    Attending Physician:   Hari Calero MD

## 2018-04-14 NOTE — PROGRESS NOTES
[facility] Pharmacy Dosing Services: Antimicrobial Stewardship Progress Note    Consult for antibiotic dosing of Vancomycin by Dr. Estee Cornejo  Pharmacist reviewed antibiotic appropriateness for 69year old , male  for indication of Sepsis, UTI with hx of MRSA UTI  Day of Therapy 1    Plan:  Vancomycin therapy:  Start Vancomycin therapy, with loading dose of 1500 (mg) at 0200 4/14/18. Recommendation:  pharmacy will follow for further orders from admitting provider  Other Antimicrobial  (not dosed by pharmacist)   ceftriaxone   Cultures        Serum Creatinine     Lab Results   Component Value Date/Time    Creatinine 1.55 (H) 04/13/2018 11:33 PM       Creatinine Clearance Estimated Creatinine Clearance: 39.3 mL/min (based on Cr of 1.55).      Temp   97.9 °F (36.6 °C)    WBC   Lab Results   Component Value Date/Time    WBC 13.6 (H) 04/13/2018 11:33 PM       H/H   Lab Results   Component Value Date/Time    HGB 11.2 (L) 04/13/2018 11:33 PM        Platelets   Lab Results   Component Value Date/Time    PLATELET 268 (H) 25/30/5483 11:33 PM          Pharmacist: Nicolette information: [phone]

## 2018-04-14 NOTE — ED NOTES
TRANSFER - OUT REPORT:    Verbal report given to Mariaa Mata RN (name) on Cassie Harry  being transferred to [facility] ER (unit) for routine progression of care       Report consisted of patients Situation, Background, Assessment and   Recommendations(SBAR). Information from the following report(s) SBAR, Kardex, ED Summary, Intake/Output and MAR was reviewed with the receiving nurse. Lines:   Triple Lumen Central Line 04/14/18 Right Neck (Active)       Peripheral IV 04/13/18 Left Forearm (Active)   Site Assessment Clean, dry, & intact 4/13/2018 11:40 PM   Phlebitis Assessment 0 4/13/2018 11:40 PM   Infiltration Assessment 0 4/13/2018 11:40 PM   Dressing Status Clean, dry, & intact 4/13/2018 11:40 PM   Dressing Type Transparent 4/13/2018 11:40 PM   Hub Color/Line Status Green;Flushed 4/13/2018 11:40 PM       Peripheral IV 04/13/18 Right Forearm (Active)   Site Assessment Clean, dry, & intact 4/13/2018 11:41 PM   Phlebitis Assessment 0 4/13/2018 11:41 PM   Infiltration Assessment 0 4/13/2018 11:41 PM   Dressing Status Clean, dry, & intact 4/13/2018 11:41 PM   Dressing Type Transparent 4/13/2018 11:41 PM   Hub Color/Line Status Pink;Flushed 4/13/2018 11:41 PM   Action Taken Blood drawn 4/13/2018 11:41 PM   Alcohol Cap Used Yes 4/13/2018 11:41 PM        Opportunity for questions and clarification was provided.       Patient transported with:   Monitor  Registered Nurse

## 2018-04-14 NOTE — ED NOTES
Dr. Diya Gaitan has seen and explained to patient need for placement of ureteral stent due to obstructing stone, patient voiced understanding.

## 2018-04-14 NOTE — ED TRIAGE NOTES
Triage note:  Pt arrived via [place] with c/o Fever and not being able to urinate. Pt unsure when this started, but has Prostate surgery in January 2018 at [facility]. Pt from [address] [place] [address] and with 2 officers.

## 2018-04-14 NOTE — ED NOTES
Pt placed in isolation precautions due to last culture growing Dr. Aggie Haq hospitalist - notes baseline Cr 1.  Last U/C grew proteus sensitive to Rocephin and MRSA

## 2018-04-14 NOTE — CONSULTS
70 y/o male with a history of fever. He is a poor historian and is not able to give any details. He was seen in the ED and found to have a 9mm obstructing right ureteral calculus with hydronephrosis. He is admitted for sepsis and we are asked to see him for management of this stone. He denies pain or hematuria. Chart reviewed and patient examined. A:UTI/sepsis. On abx  2.right ureteral calculus with obstruction. Will need cysto and stent placement urgently  3.hydronephrosis secondary to obstructing stone. 4.history of prostate cancer s/p prostatectomy.   Will arrange cysto and stent placement this am.  Vicky Hurtado MD

## 2018-04-14 NOTE — ROUTINE PROCESS
TRANSFER - OUT REPORT:    Verbal report given to TRENTON Scherer(name) on Sebastian Barnard  being transferred to [facility](unit) for routine post - op       Report consisted of patients Situation, Background, Assessment and   Recommendations(SBAR). Information from the following report(s) SBAR and MAR was reviewed with the receiving nurse. Opportunity for questions and clarification was provided.       Patient transported with:   O2 @ 2 liters  Registered Nurse

## 2018-04-14 NOTE — OP NOTES
[address] REPORT    Lance Perea  MR#: [id number]  : 1950  ACCOUNT #: [de-identified]   DATE OF SERVICE: 2018    SURGEON:   LYLE Gomes Sickle: none    PREOPERATIVE DIAGNOSIS:  Right distal ureteral calculus with marked hydronephrosis, urinary tract infection, pyelonephritis and sepsis. POSTOPERATIVE DIAGNOSIS:  Right distal ureteral calculus with marked hydronephrosis, urinary tract infection, pyelonephritis and sepsis with marked pyonephrosis, diffuse bladder inflammation and marked hydronephrosis, large obstructing distal right ureteral calculus. OPERATIVE PROCEDURE:  Cystoscopy, right retrograde pyelogram, right double-J stent placement with cultures from the right renal pelvis and bladder. ANESTHESIA:  General.    CLINICAL INDICATIONS:  The patient is a gentleman who had been seen and evaluated in the Emergency Department with chills and fever. He was not having any flank pain or other complaints in that regard. However, he underwent a CT scan showing a large distal right ureteral calculus with marked hydronephrosis and perinephric stranding. He was noted to have a urinary tract infection, as well evidence of possible sepsis,  and we were asked to see him for evaluation for possible stent placement. Potential risks and complications were reviewed with him after his information and CT scan was reviewed. OPERATIVE PROCEDURE:  He was taken to the operating room and placed in the dorsal lithotomy position after general anesthesia was induced. He had been given parenteral antibiotics preoperatively. His penis and scrotum were prepped and draped. A timeout was taken. Cystoscope was introduced and his urethra was normal.  His prostatic urethra was surgically absent. His bladder had diffuse inflammatory changes, but no mucosal lesions.   Ureteral orifices were normal.  A culture was taken of his bladder urine on introduction of the cystoscope. After this was done, the right ureteral orifice was identified, cannulated with a 5 Filipino angiographic catheter and a small amount of retrograde contrast was instilled. This demonstrated a normal distal ureter, a large calculus obstructing his distal ureter. He had marked hydronephrosis proximally to that and only put a small amount of contrast in his ureter to outline the stone location. We then passed initially a Bentson wire, which would not negotiate this stone. After a couple of attempts, I removed it and passed an 0.025 angled Glidewire which easily went around the stone up to the renal pelvis. I carefully advanced the angiographic catheter over the wire up to the level of the renal pelvis and the wire was removed. I aspirated approximately 40 mL of markedly purulent urine from his renal pelvis. After this was done, we instilled a small amount of contrast in his renal pelvis documenting the location of this angiographic catheter. A 0.035 Bentson wire was then introduced into the angiographic catheter and directed up to the level of his renal pelvis. After this was done, the angiographic catheter was removed and a 6-Filipino 28 cm double-J stent was advanced over the wire into his renal pelvis. It was coiled proximally in his renal pelvis and a coil in his bladder. This was documented fluoroscopically and cystoscopically. After its position was confirmed,  the cystoscope was removed. A Matamoros catheter was placed and he was returned to recovery room in stable condition. He tolerated this well. COMPLICATIONS:  There were no intraoperative complications. ESTIMATED BLOOD LOSS:   No blood loss. SPECIMENS:  Specimen sent was urine from bladder and from right renal pelvis. IMPLANTS:  Right double-J stent. DRAINS:  Matamoros catheter.       MD CRISSY Caal / CLAYTON  D: 04/14/2018 11:02     T: 04/14/2018 11:33  JOB #: [id number]

## 2018-04-15 LAB
ALBUMIN SERPL-MCNC: 1.6 G/DL (ref 3.5–5)
ALBUMIN/GLOB SERPL: 0.4 {RATIO} (ref 1.1–2.2)
ALP SERPL-CCNC: 69 U/L (ref 45–117)
ALT SERPL-CCNC: 25 U/L (ref 12–78)
ANION GAP SERPL CALC-SCNC: 10 MMOL/L (ref 5–15)
APTT PPP: 39 SEC (ref 22.1–32)
APTT PPP: 45.8 SEC (ref 22.1–32)
APTT PPP: 60.8 SEC (ref 22.1–32)
AST SERPL-CCNC: 29 U/L (ref 15–37)
ATRIAL RATE: 103 BPM
ATRIAL RATE: 120 BPM
BACTERIA SPEC CULT: NORMAL
BASOPHILS # BLD: 0 K/UL (ref 0–0.1)
BASOPHILS NFR BLD: 0 % (ref 0–1)
BILIRUB SERPL-MCNC: 0.6 MG/DL (ref 0.2–1)
BUN SERPL-MCNC: 15 MG/DL (ref 6–20)
BUN/CREAT SERPL: 14 (ref 12–20)
CALCIUM SERPL-MCNC: 7.1 MG/DL (ref 8.5–10.1)
CALCULATED P AXIS, ECG09: 53 DEGREES
CALCULATED P AXIS, ECG09: 56 DEGREES
CALCULATED R AXIS, ECG10: -36 DEGREES
CALCULATED R AXIS, ECG10: -37 DEGREES
CALCULATED T AXIS, ECG11: 57 DEGREES
CALCULATED T AXIS, ECG11: 59 DEGREES
CC UR VC: NORMAL
CHLORIDE SERPL-SCNC: 109 MMOL/L (ref 97–108)
CO2 SERPL-SCNC: 19 MMOL/L (ref 21–32)
CREAT SERPL-MCNC: 1.09 MG/DL (ref 0.7–1.3)
DIAGNOSIS, 93000: NORMAL
DIAGNOSIS, 93000: NORMAL
DIFFERENTIAL METHOD BLD: ABNORMAL
EOSINOPHIL # BLD: 0 K/UL (ref 0–0.4)
EOSINOPHIL NFR BLD: 1 % (ref 0–7)
ERYTHROCYTE [DISTWIDTH] IN BLOOD BY AUTOMATED COUNT: 16.7 % (ref 11.5–14.5)
EST. AVERAGE GLUCOSE BLD GHB EST-MCNC: 120 MG/DL
FERRITIN SERPL-MCNC: 406 NG/ML (ref 26–388)
FOLATE SERPL-MCNC: 7.1 NG/ML (ref 5–21)
GLOBULIN SER CALC-MCNC: 3.7 G/DL (ref 2–4)
GLUCOSE BLD STRIP.AUTO-MCNC: 101 MG/DL (ref 65–100)
GLUCOSE BLD STRIP.AUTO-MCNC: 71 MG/DL (ref 65–100)
GLUCOSE BLD STRIP.AUTO-MCNC: 79 MG/DL (ref 65–100)
GLUCOSE BLD STRIP.AUTO-MCNC: 81 MG/DL (ref 65–100)
GLUCOSE BLD STRIP.AUTO-MCNC: 82 MG/DL (ref 65–100)
GLUCOSE SERPL-MCNC: 92 MG/DL (ref 65–100)
HBA1C MFR BLD: 5.8 % (ref 4.2–6.3)
HCT VFR BLD AUTO: 25.7 % (ref 36.6–50.3)
HGB BLD-MCNC: 8 G/DL (ref 12.1–17)
IMM GRANULOCYTES # BLD: 0 K/UL (ref 0–0.04)
IMM GRANULOCYTES NFR BLD AUTO: 0 % (ref 0–0.5)
IRON SATN MFR SERPL: 8 % (ref 20–50)
IRON SERPL-MCNC: 9 UG/DL (ref 35–150)
LYMPHOCYTES # BLD: 1.2 K/UL (ref 0.8–3.5)
LYMPHOCYTES NFR BLD: 15 % (ref 12–49)
MCH RBC QN AUTO: 25 PG (ref 26–34)
MCHC RBC AUTO-ENTMCNC: 31.1 G/DL (ref 30–36.5)
MCV RBC AUTO: 80.3 FL (ref 80–99)
MONOCYTES # BLD: 0.6 K/UL (ref 0–1)
MONOCYTES NFR BLD: 7 % (ref 5–13)
NEUTS SEG # BLD: 6 K/UL (ref 1.8–8)
NEUTS SEG NFR BLD: 76 % (ref 32–75)
NRBC # BLD: 0 K/UL (ref 0–0.01)
NRBC BLD-RTO: 0 PER 100 WBC
P-R INTERVAL, ECG05: 150 MS
P-R INTERVAL, ECG05: 154 MS
PLATELET # BLD AUTO: 300 K/UL (ref 150–400)
PMV BLD AUTO: 9.1 FL (ref 8.9–12.9)
POTASSIUM SERPL-SCNC: 4.1 MMOL/L (ref 3.5–5.1)
PROT SERPL-MCNC: 5.3 G/DL (ref 6.4–8.2)
Q-T INTERVAL, ECG07: 324 MS
Q-T INTERVAL, ECG07: 348 MS
QRS DURATION, ECG06: 80 MS
QRS DURATION, ECG06: 84 MS
QTC CALCULATION (BEZET), ECG08: 455 MS
QTC CALCULATION (BEZET), ECG08: 457 MS
RBC # BLD AUTO: 3.2 M/UL (ref 4.1–5.7)
SERVICE CMNT-IMP: ABNORMAL
SERVICE CMNT-IMP: NORMAL
SODIUM SERPL-SCNC: 138 MMOL/L (ref 136–145)
THERAPEUTIC RANGE,PTTT: ABNORMAL SECS (ref 58–77)
TIBC SERPL-MCNC: 113 UG/DL (ref 250–450)
TROPONIN I SERPL-MCNC: 1.35 NG/ML
TROPONIN I SERPL-MCNC: 1.75 NG/ML
VENTRICULAR RATE, ECG03: 103 BPM
VENTRICULAR RATE, ECG03: 120 BPM
VIT B12 SERPL-MCNC: 879 PG/ML (ref 193–986)
WBC # BLD AUTO: 7.8 K/UL (ref 4.1–11.1)

## 2018-04-15 PROCEDURE — 96375 TX/PRO/DX INJ NEW DRUG ADDON: CPT

## 2018-04-15 PROCEDURE — 85025 COMPLETE CBC W/AUTO DIFF WBC: CPT | Performed by: INTERNAL MEDICINE

## 2018-04-15 PROCEDURE — 74011250636 HC RX REV CODE- 250/636: Performed by: INTERNAL MEDICINE

## 2018-04-15 PROCEDURE — 65660000000 HC RM CCU STEPDOWN

## 2018-04-15 PROCEDURE — 36415 COLL VENOUS BLD VENIPUNCTURE: CPT | Performed by: INTERNAL MEDICINE

## 2018-04-15 PROCEDURE — 74011250637 HC RX REV CODE- 250/637: Performed by: INTERNAL MEDICINE

## 2018-04-15 PROCEDURE — 82746 ASSAY OF FOLIC ACID SERUM: CPT | Performed by: INTERNAL MEDICINE

## 2018-04-15 PROCEDURE — 83540 ASSAY OF IRON: CPT | Performed by: INTERNAL MEDICINE

## 2018-04-15 PROCEDURE — 85730 THROMBOPLASTIN TIME PARTIAL: CPT | Performed by: INTERNAL MEDICINE

## 2018-04-15 PROCEDURE — 83036 HEMOGLOBIN GLYCOSYLATED A1C: CPT | Performed by: INTERNAL MEDICINE

## 2018-04-15 PROCEDURE — 74011000258 HC RX REV CODE- 258: Performed by: INTERNAL MEDICINE

## 2018-04-15 PROCEDURE — 93306 TTE W/DOPPLER COMPLETE: CPT

## 2018-04-15 PROCEDURE — 80053 COMPREHEN METABOLIC PANEL: CPT | Performed by: INTERNAL MEDICINE

## 2018-04-15 PROCEDURE — 82962 GLUCOSE BLOOD TEST: CPT

## 2018-04-15 PROCEDURE — 80202 ASSAY OF VANCOMYCIN: CPT | Performed by: INTERNAL MEDICINE

## 2018-04-15 PROCEDURE — 74011250636 HC RX REV CODE- 250/636: Performed by: EMERGENCY MEDICINE

## 2018-04-15 PROCEDURE — 74011000250 HC RX REV CODE- 250: Performed by: INTERNAL MEDICINE

## 2018-04-15 PROCEDURE — 82728 ASSAY OF FERRITIN: CPT | Performed by: INTERNAL MEDICINE

## 2018-04-15 PROCEDURE — 87040 BLOOD CULTURE FOR BACTERIA: CPT | Performed by: INTERNAL MEDICINE

## 2018-04-15 PROCEDURE — 77010033678 HC OXYGEN DAILY

## 2018-04-15 PROCEDURE — 84484 ASSAY OF TROPONIN QUANT: CPT | Performed by: INTERNAL MEDICINE

## 2018-04-15 PROCEDURE — 82607 VITAMIN B-12: CPT | Performed by: INTERNAL MEDICINE

## 2018-04-15 RX ORDER — CLOPIDOGREL BISULFATE 75 MG/1
75 TABLET ORAL DAILY
Status: DISCONTINUED | OUTPATIENT
Start: 2018-04-15 | End: 2018-04-16

## 2018-04-15 RX ORDER — HEPARIN SODIUM 5000 [USP'U]/ML
60 INJECTION, SOLUTION INTRAVENOUS; SUBCUTANEOUS
Status: COMPLETED | OUTPATIENT
Start: 2018-04-15 | End: 2018-04-15

## 2018-04-15 RX ORDER — HEPARIN SODIUM 5000 [USP'U]/ML
1850 INJECTION, SOLUTION INTRAVENOUS; SUBCUTANEOUS ONCE
Status: COMPLETED | OUTPATIENT
Start: 2018-04-15 | End: 2018-04-15

## 2018-04-15 RX ORDER — METOPROLOL SUCCINATE 25 MG/1
12.5 TABLET, EXTENDED RELEASE ORAL DAILY
Status: DISCONTINUED | OUTPATIENT
Start: 2018-04-15 | End: 2018-04-18 | Stop reason: HOSPADM

## 2018-04-15 RX ORDER — HEPARIN SODIUM 10000 [USP'U]/ML
1851 INJECTION, SOLUTION INTRAVENOUS; SUBCUTANEOUS ONCE
Status: DISCONTINUED | OUTPATIENT
Start: 2018-04-15 | End: 2018-04-15

## 2018-04-15 RX ORDER — ASPIRIN 325 MG
325 TABLET, DELAYED RELEASE (ENTERIC COATED) ORAL DAILY
Status: DISCONTINUED | OUTPATIENT
Start: 2018-04-16 | End: 2018-04-18 | Stop reason: HOSPADM

## 2018-04-15 RX ORDER — SODIUM CHLORIDE 9 MG/ML
500 INJECTION, SOLUTION INTRAVENOUS ONCE
Status: COMPLETED | OUTPATIENT
Start: 2018-04-15 | End: 2018-04-15

## 2018-04-15 RX ADMIN — CLOPIDOGREL BISULFATE 75 MG: 75 TABLET ORAL at 16:40

## 2018-04-15 RX ADMIN — METRONIDAZOLE 500 MG: 500 INJECTION, SOLUTION INTRAVENOUS at 11:49

## 2018-04-15 RX ADMIN — Medication 10 ML: at 21:09

## 2018-04-15 RX ADMIN — ASPIRIN 81 MG: 81 TABLET, COATED ORAL at 10:01

## 2018-04-15 RX ADMIN — METRONIDAZOLE 500 MG: 500 INJECTION, SOLUTION INTRAVENOUS at 06:22

## 2018-04-15 RX ADMIN — PIPERACILLIN SODIUM AND TAZOBACTAM SODIUM 3.38 G: 3; .375 INJECTION, POWDER, LYOPHILIZED, FOR SOLUTION INTRAVENOUS at 21:03

## 2018-04-15 RX ADMIN — Medication 10 ML: at 06:00

## 2018-04-15 RX ADMIN — PIPERACILLIN SODIUM AND TAZOBACTAM SODIUM 3.38 G: 3; .375 INJECTION, POWDER, LYOPHILIZED, FOR SOLUTION INTRAVENOUS at 13:04

## 2018-04-15 RX ADMIN — METRONIDAZOLE 500 MG: 500 INJECTION, SOLUTION INTRAVENOUS at 00:04

## 2018-04-15 RX ADMIN — HEPARIN SODIUM 17.96 UNITS/KG/HR: 10000 INJECTION, SOLUTION INTRAVENOUS at 20:30

## 2018-04-15 RX ADMIN — ATORVASTATIN CALCIUM 40 MG: 20 TABLET, FILM COATED ORAL at 21:00

## 2018-04-15 RX ADMIN — SODIUM CHLORIDE 125 ML/HR: 900 INJECTION, SOLUTION INTRAVENOUS at 13:05

## 2018-04-15 RX ADMIN — PANTOPRAZOLE SODIUM 40 MG: 40 TABLET, DELAYED RELEASE ORAL at 10:01

## 2018-04-15 RX ADMIN — SODIUM CHLORIDE 500 ML: 900 INJECTION, SOLUTION INTRAVENOUS at 08:45

## 2018-04-15 RX ADMIN — METOPROLOL SUCCINATE 12.5 MG: 25 TABLET, EXTENDED RELEASE ORAL at 16:40

## 2018-04-15 RX ADMIN — Medication 10 ML: at 13:06

## 2018-04-15 RX ADMIN — HEPARIN SODIUM 1850 UNITS: 5000 INJECTION, SOLUTION INTRAVENOUS; SUBCUTANEOUS at 19:21

## 2018-04-15 RX ADMIN — HEPARIN SODIUM 3700 UNITS: 5000 INJECTION, SOLUTION INTRAVENOUS; SUBCUTANEOUS at 04:53

## 2018-04-15 RX ADMIN — PIPERACILLIN SODIUM AND TAZOBACTAM SODIUM 3.38 G: 3; .375 INJECTION, POWDER, LYOPHILIZED, FOR SOLUTION INTRAVENOUS at 04:19

## 2018-04-15 RX ADMIN — VANCOMYCIN HYDROCHLORIDE 1000 MG: 1 INJECTION, POWDER, LYOPHILIZED, FOR SOLUTION INTRAVENOUS at 01:13

## 2018-04-15 RX ADMIN — PANTOPRAZOLE SODIUM 40 MG: 40 TABLET, DELAYED RELEASE ORAL at 16:40

## 2018-04-15 RX ADMIN — SODIUM CHLORIDE 125 ML/HR: 900 INJECTION, SOLUTION INTRAVENOUS at 21:04

## 2018-04-15 RX ADMIN — SODIUM CHLORIDE 100 ML/HR: 900 INJECTION, SOLUTION INTRAVENOUS at 03:44

## 2018-04-15 NOTE — PROGRESS NOTES
No pain  Afebrile  BP borderline low. Labs reviewed. A: sever pyonephrosis from obstructing stone and infection. S/p stent. Will need definitive management of stone in 2 to 3 weeks after infection treated. Will see as outpatient for scheduling. 2.right ureteral calculus. Plan as above. 3.elevated creat improved. 4.sepsis on abx.   Peterson Buckley MD

## 2018-04-15 NOTE — PROGRESS NOTES
Bedside and Verbal shift change report given to Gerry Lin (oncoming nurse) by Qian Escudero (offgoing nurse). Report included the following information SBAR, Kardex and MAR.     0720 - Patient resting in bed. No complaints of pain or discomfort. [employer]al officers at bedside. Will monitor. Primary Nurse Myriam Marlow and Qian Escudero RN performed a dual skin assessment on this patient Impairment noted- see wound doc flow sheet  Prasad score is 19    0845 - Bolus of 500 mL of NS administered per orders. TRANSFER - OUT REPORT:    Verbal report given to Leslie Martinez RN (name) on Nitza Tyler  being transferred to 5th floor (unit) for routine progression of care       Report consisted of patients Situation, Background, Assessment and   Recommendations(SBAR). Information from the following report(s) SBAR, Kardex and MAR was reviewed with the receiving nurse. Lines:   Triple Lumen Central Line 04/14/18 Right Neck (Active)   Central Line Being Utilized Yes 4/15/2018  4:00 AM   Criteria for Appropriate Use Hemodynamically unstable, requiring monitoring lines, vasopressors, or volume resuscitation 4/15/2018  4:00 AM   Site Assessment Clean, dry, & intact 4/15/2018  4:00 AM   Infiltration Assessment 0 4/15/2018  4:00 AM   Dressing Status Clean, dry, & intact 4/15/2018  4:00 AM   Dressing Type Transparent;Tape 4/15/2018  4:00 AM   Action Taken Open ports on tubing capped 4/15/2018  4:00 AM   Proximal Hub Color/Line Status White; Infusing 4/15/2018  4:00 AM   Positive Blood Return (Medial Site) Yes 4/15/2018  4:00 AM   Medial Hub Color/Line Status Blue; Infusing 4/15/2018  4:00 AM   Positive Blood Return (Lateral Site) Yes 4/15/2018  4:00 AM   Distal Hub Color/Line Status Brown; Infusing 4/15/2018  4:00 AM   Positive Blood Return (Site #3) Yes 4/15/2018  4:00 AM   Alcohol Cap Used Yes 4/15/2018  4:00 AM       Peripheral IV 04/13/18 Right Forearm (Active)   Site Assessment Clean, dry, & intact 4/15/2018  4:00 AM   Phlebitis Assessment 0 4/15/2018  4:00 AM   Infiltration Assessment 0 4/15/2018  4:00 AM   Dressing Status Clean, dry, & intact 4/15/2018  4:00 AM   Dressing Type Transparent;Tape 4/15/2018  4:00 AM   Hub Color/Line Status Pink; Infusing 4/15/2018  4:00 AM   Action Taken Open ports on tubing capped 4/15/2018  4:00 AM   Alcohol Cap Used Yes 4/15/2018  4:00 AM        Opportunity for questions and clarification was provided.       Patient transported with:   Monitor  O2 @ 3 liters  Tech

## 2018-04-15 NOTE — PROGRESS NOTES
1930: TRANSFER - IN REPORT: Verbal report received from TRENTON Roy(name) on Janice Regan  being received from [employer]) for routine post - op Report consisted of patients Situation, Background, Assessment and Recommendations(SBAR). Information from the following report(s) SBAR, Kardex, ED Summary, OR Summary, Intake/Output, MAR, Recent Results and Cardiac Rhythm NSR was reviewed with the receiving nurse. Opportunity for questions and clarification was provided. Assessment completed upon patients arrival to unit and care assumed. 2000: Patient resting in bed, two prison guards at bedside. Patient with no complaints of pain. Urine output pink, dyson draining and patent. Alert and oriented x 4. 4L NC. Catheter site intact. Call bell within reach. Will continue to monitor. 2148: Received call from [facility] regarding patient's positive blood culture from [facility]. Paired blood cultures drawn 4/13 positive for gram negative rods drawn from left AC in 1/4 bottles. 2155: Dr. Corinne Leys informed of blood culture results. Orders for Vancomycin and repeat blood cultures in AM.  2200: Patient's Heparin drip paused at 9AM, patient in PACU since 12PM and did not arrive to unit until 7PM. Heparin paused since 9AM without any order clarification post-op. Patient initially placed on Heparin for type two MI due to shock by Dr. Jakub Suazo per cardiology recommendations. No cardiology notes noted on file. Paged out to on-call hospitalist Dr. Corinne Leys, informed of patient's Heparin drip orders, per Dr. Corinne Leys may re-start Heparin drip at initial infusion rate and check with urologist Dr. Sabrina Carcamo regarding re-starting Heparin as well. Paged out to urologist on-call, Dr. Sabrina Carcamo, informed of patient's Heparin order. Per Dr. Sabrina Carcamo, may re-start Heparin and bloody urine output may be expected.  Pharmacist Usman Catherine informed of Dr. Corinne Leys and Dr. Sapna Hernandez recommendations regarding resuming Heparin, clarified whether repeat PTT is required for prior to resuming Heparin, per pharmacist, not necessary. Will check PTT in 6 hours after resuming. Will resume Heparin at initial infusion rate of 12 units/kg/hr. 2300: ED note at 10AM claims consult called to Dr. Mortimer Kirks. Patient has been in PACU 12PM - 7PM. No cardiology note found. Will go ahead and call in new consult to cardiology. Dr. Mortimer Kirks on-call tonight. 2330: Troponin drawn and sent. 2345: Dr. Mortimer Kirks did not return call. Re-paged. 2350: Spoke to Dr. Mortimer Kirks. Dr. Mortimer Kirks stated that he went to ED this morning to see patient however patient had gone to OR. Informed of surgery patient had performed, informed that Heparin was re-started per Dr. Mary Mejia and urologist Dr. Hernandez Amber recommendations. Dr. Mortimer Kirks agreed with recommendations to resume Heparin and he will follow up with patient. Informed that another Troponin was drawn and sent. Per Dr. Mortimer Kirks if troponin trending down, no need to re-draw. No further orders at this time. Patient pain free and asymptomatic. Resting in bed. Will continue to monitor. 0200: Patient resting, no changes at this time. 0400: Re-assessments performed, no changes at this time. AM labs drawn and sent. 0500: Dr. Mary Mejia informed of patient's Hgb of 8.0. No further orders received at this time. 0700: Bedside and Verbal shift change report given to Pedro Hong RN (oncoming nurse) by Aniya Pineda RN (offgoing nurse). Report included the following information SBAR, Kardex, ED Summary, OR Summary, Intake/Output, MAR, Recent Results and Cardiac Rhythm NSR.   0730: Primary Nurse Celia Thompson and Pedro Hong RN performed a dual skin assessment on this patient No impairment noted  Prasad score is 19.

## 2018-04-15 NOTE — PROGRESS NOTES
Problem: Falls - Risk of  Goal: *Absence of Falls  Document Primo Fall Risk and appropriate interventions in the flowsheet.    Outcome: Progressing Towards Goal  Fall Risk Interventions:            Medication Interventions: Evaluate medications/consider consulting pharmacy, Patient to call before getting OOB    Elimination Interventions: Call light in reach, Patient to call for help with toileting needs, Toileting schedule/hourly rounds

## 2018-04-15 NOTE — CONSULTS
SUBJECTIVE      Raffy Shoulder is a 69 y.o. male admitted for hydronephrosis with renal and ureteral obstruction now s/p stent placement noted to have a troponin level of 3 on admission that has been downtrending. EKG nonischemic. Denies chest pain, SOB. Previous heavy smoker. Started on heparin gtt.        ACTIVE PROBLEM LIST     Patient Active Problem List    Diagnosis Date Noted    Elevated troponin 04/14/2018    Septic shock ([facility] 04/14/2018    AGUEDA (acute kidney injury) ([facility] 04/14/2018    High anion gap metabolic acidosis 95/79/5581    Hydronephrosis with renal and ureteral calculous obstruction 04/14/2018    Type 2 diabetes mellitus without complication ([facility] 42/36/7977    Emphysema of lung ([facility] 04/14/2018          MEDICATIONS     Current Facility-Administered Medications   Medication Dose Route Frequency    sodium chloride (NS) flush 5-10 mL  5-10 mL IntraVENous Q8H    sodium chloride (NS) flush 5-10 mL  5-10 mL IntraVENous PRN    naloxone (NARCAN) injection 0.4 mg  0.4 mg IntraVENous PRN    NOREPINephrine (LEVOPHED) 4 mg in dextrose 5% 250 mL infusion  2-16 mcg/min IntraVENous TITRATE    heparin 25,000 units in D5W 250 ml infusion  12-25 Units/kg/hr IntraVENous TITRATE    metroNIDAZOLE (FLAGYL) IVPB premix 500 mg  500 mg IntraVENous Q6H    0.9% sodium chloride infusion  125 mL/hr IntraVENous CONTINUOUS    pantoprazole (PROTONIX) tablet 40 mg  40 mg Oral ACB&D    insulin lispro (HUMALOG) injection   SubCUTAneous TIDAC    glucose chewable tablet 16 g  4 Tab Oral PRN    dextrose (D50W) injection syrg 12.5-25 g  12.5-25 g IntraVENous PRN    glucagon (GLUCAGEN) injection 1 mg  1 mg IntraMUSCular PRN    aspirin delayed-release tablet 81 mg  81 mg Oral DAILY    atorvastatin (LIPITOR) tablet 40 mg  40 mg Oral QHS    piperacillin-tazobactam (ZOSYN) 3.375 g in 0.9% sodium chloride (MBP/ADV) 100 mL  3.375 g IntraVENous Q8H    vancomycin (VANCOCIN) 1,000 mg in 0.9% sodium chloride (MBP/ADV) 250 mL  1,000 mg IntraVENous Q24H          PAST MEDICAL HISTORY     Past Medical History:   Diagnosis Date    Diabetes ([address]     Gastrointestinal disorder     GERD    Ill-defined condition     Liver failure           PAST SURGICAL HISTORY     Past Surgical History:   Procedure Laterality Date    ABDOMEN SURGERY PROC UNLISTED      HX PROSTATECTOMY      Jan 2018          ALLERGIES     No Known Allergies       FAMILY HISTORY     History reviewed. No pertinent family history.  negative for cardiac disease     SOCIAL HISTORY     Social History     Social History    Marital status: SINGLE     Spouse name: N/A    Number of children: N/A    Years of education: N/A     Social History Main Topics    Smoking status: Former Smoker    Smokeless tobacco: Never Used    Alcohol use No    Drug use: No    Sexual activity: Not Asked     Other Topics Concern    None     Social History Narrative       MEDICATIONS     Current Facility-Administered Medications   Medication Dose Route Frequency    sodium chloride (NS) flush 5-10 mL  5-10 mL IntraVENous Q8H    sodium chloride (NS) flush 5-10 mL  5-10 mL IntraVENous PRN    naloxone (NARCAN) injection 0.4 mg  0.4 mg IntraVENous PRN    NOREPINephrine (LEVOPHED) 4 mg in dextrose 5% 250 mL infusion  2-16 mcg/min IntraVENous TITRATE    heparin 25,000 units in D5W 250 ml infusion  12-25 Units/kg/hr IntraVENous TITRATE    metroNIDAZOLE (FLAGYL) IVPB premix 500 mg  500 mg IntraVENous Q6H    0.9% sodium chloride infusion  125 mL/hr IntraVENous CONTINUOUS    pantoprazole (PROTONIX) tablet 40 mg  40 mg Oral ACB&D    insulin lispro (HUMALOG) injection   SubCUTAneous TIDAC    glucose chewable tablet 16 g  4 Tab Oral PRN    dextrose (D50W) injection syrg 12.5-25 g  12.5-25 g IntraVENous PRN    glucagon (GLUCAGEN) injection 1 mg  1 mg IntraMUSCular PRN    aspirin delayed-release tablet 81 mg  81 mg Oral DAILY    atorvastatin (LIPITOR) tablet 40 mg  40 mg Oral QHS    piperacillin-tazobactam (ZOSYN) 3.375 g in 0.9% sodium chloride (MBP/ADV) 100 mL  3.375 g IntraVENous Q8H    vancomycin (VANCOCIN) 1,000 mg in 0.9% sodium chloride (MBP/ADV) 250 mL  1,000 mg IntraVENous Q24H       I have reviewed the nurses notes, vitals, problem list, allergy list, medical history, family, social history and medications. REVIEW OF SYMPTOMS      General: Pt denies excessive weight gain or loss. Pt is able to conduct ADL's  HEENT: Denies blurred vision, headaches, hearing loss, epistaxis and difficulty swallowing. Respiratory: Denies cough, congestion, shortness of breath, SAVAGE, wheezing or stridor. Cardiovascular: Denies precordial pain, palpitations, edema or PND  Gastrointestinal: Denies poor appetite, indigestion, abdominal pain or blood in stool  Genitourinary: Denies hematuria, dysuria, increased urinary frequency  Musculoskeletal: Denies joint pain or swelling from muscles or joints  Neurologic: Denies tremor, paresthesias, headache, or sensory motor disturbance  Psychiatric: Denies confusion, insomnia, depression  Integumentray: Denies rash, itching or ulcers. Hematologic: Denies easy bruising, bleeding       PHYSICAL EXAMINATION      Vitals:    04/15/18 0900 04/15/18 1000 04/15/18 1100 04/15/18 1200   BP: 95/64 109/81 111/74 102/52   Pulse: 80 83 82 82   Resp: 19 17 22 20   Temp:    98.4 °F (36.9 °C)   SpO2: 100% 90%  97%   Weight:       Height:         General: Well developed, in no acute distress. HEENT: No jaundice, oral mucosa moist, no oral ulcers  Neck: Supple, no stiffness, no lymphadenopathy, supple  Heart:  Normal S1/S2 negative S3 or S4. Regular, no murmur, gallop or rub, no jugular venous distention  Respiratory: Clear bilaterally x 4, no wheezing or rales  Abdomen:   Soft, non-tender, bowel sounds are active.   Extremities:  No edema, normal cap refill, no cyanosis.   Musculoskeletal: No clubbing, no deformities  Neuro: A&Ox3, speech clear, gait stable, cooperative, no focal neurologic deficits  Skin: Skin color is normal. No rashes or lesions. Non diaphoretic, moist.  Vascular: 2+ pulses symmetric in all extremities       DIAGNOSTIC DATA      TELEMETRY: SR    EKG: sinus rhythm       LABORATORY DATA      Lab Results   Component Value Date/Time    WBC 7.8 04/15/2018 04:07 AM    HGB 8.0 (L) 04/15/2018 04:07 AM    HCT 25.7 (L) 04/15/2018 04:07 AM    PLATELET 074 15/03/0850 04:07 AM    MCV 80.3 04/15/2018 04:07 AM      Lab Results   Component Value Date/Time    Sodium 138 04/15/2018 04:07 AM    Potassium 4.1 04/15/2018 04:07 AM    Chloride 109 (H) 04/15/2018 04:07 AM    CO2 19 (L) 04/15/2018 04:07 AM    Anion gap 10 04/15/2018 04:07 AM    Glucose 92 04/15/2018 04:07 AM    BUN 15 04/15/2018 04:07 AM    Creatinine 1.09 04/15/2018 04:07 AM    BUN/Creatinine ratio 14 04/15/2018 04:07 AM    GFR est AA >60 04/15/2018 04:07 AM    GFR est non-AA >60 04/15/2018 04:07 AM    Calcium 7.1 (L) 04/15/2018 04:07 AM    Bilirubin, total 0.6 04/15/2018 04:07 AM    AST (SGOT) 29 04/15/2018 04:07 AM    Alk. phosphatase 69 04/15/2018 04:07 AM    Protein, total 5.3 (L) 04/15/2018 04:07 AM    Albumin 1.6 (L) 04/15/2018 04:07 AM    Globulin 3.7 04/15/2018 04:07 AM    A-G Ratio 0.4 (L) 04/15/2018 04:07 AM    ALT (SGPT) 25 04/15/2018 04:07 AM           ASSESSMENT      1. NSTEMI  2. Fever  3. UTI  4. Urinary obstruction         PLAN     Add plavix; increase asa to 325 mg daily and add low dose toprol 12.5 mg daily. Continue lipitor. Continue heparin for now; likely DC heparin tomorrow. Will have interventional cardiology weigh in tomorrow regarding whether to obtain stress test or LHC or continue medical management.          Serina Carlos MD  Cardiac Electrophysiology / Cardiology    [address]  [address] [facility], [address] [facility], [address] [place] [address] Drive    [place]  [phone] / [phone] Fax   [phone] / [phone] Fax

## 2018-04-15 NOTE — PROGRESS NOTES
Aung Peralta phillip Wayne 77  5[address] [facility], [address] [place] [address]  [phone]      Medical Progress Note      NAME:         Lara Gomes   :        1950  MRM:        [id number]    Date:          4/15/2018      Subjective: Patient has been seen and examined as a follow up for septic shock. Chart, labs, diagnostics reviewed. Now off vasopressors but with borderline BPs. He says he has no pain. No nausea, vomiting or chest discomfort. Objective:    Vital Signs:    Visit Vitals    BP 97/58    Pulse 82    Temp 99 °F (37.2 °C)    Resp 18    Ht 5' 11\" (1.803 m)    Wt 61.7 kg (136 lb)    SpO2 93%    BMI 18.97 kg/m2          Intake/Output Summary (Last 24 hours) at 04/15/18 0811  Last data filed at 04/15/18 0500   Gross per 24 hour   Intake            925.2 ml   Output             1105 ml   Net           -179.8 ml        Physical Examination:    General:   Weak looking and not in much acute distress   Eyes:   pink conjunctivae, PERRLA with no discharge. ENT:   no ottorrhea or rhinorrhea with moist mucous membranes  Neck: no masses, thyroid non-tender and trachea central.  Pulm:  clear breath sounds without crackles or wheezes  Card:  has regular and normal S1, S2 without thrills, bruits or peripheral edema  Abd:  Soft, non-tender, non-distended, normoactive bowel sounds   Musc:  No cyanosis, clubbing, atrophy or deformities. Skin:  No rashes, bruising or ulcers. Neuro: Awake and alert but lapses into sleep.  Grossly non focal.     Current Facility-Administered Medications   Medication Dose Route Frequency    0.9% sodium chloride infusion 500 mL  500 mL IntraVENous ONCE    sodium chloride (NS) flush 5-10 mL  5-10 mL IntraVENous Q8H    sodium chloride (NS) flush 5-10 mL  5-10 mL IntraVENous PRN    naloxone (NARCAN) injection 0.4 mg  0.4 mg IntraVENous PRN    NOREPINephrine (LEVOPHED) 4 mg in dextrose 5% 250 mL infusion  2-16 mcg/min IntraVENous TITRATE    heparin 25,000 units in D5W 250 ml infusion  12-25 Units/kg/hr IntraVENous TITRATE    metroNIDAZOLE (FLAGYL) IVPB premix 500 mg  500 mg IntraVENous Q6H    0.9% sodium chloride infusion  125 mL/hr IntraVENous CONTINUOUS    pantoprazole (PROTONIX) tablet 40 mg  40 mg Oral ACB&D    insulin lispro (HUMALOG) injection   SubCUTAneous TIDAC    glucose chewable tablet 16 g  4 Tab Oral PRN    dextrose (D50W) injection syrg 12.5-25 g  12.5-25 g IntraVENous PRN    glucagon (GLUCAGEN) injection 1 mg  1 mg IntraMUSCular PRN    aspirin delayed-release tablet 81 mg  81 mg Oral DAILY    atorvastatin (LIPITOR) tablet 40 mg  40 mg Oral QHS    piperacillin-tazobactam (ZOSYN) 3.375 g in 0.9% sodium chloride (MBP/ADV) 100 mL  3.375 g IntraVENous Q8H    vancomycin (VANCOCIN) 1,000 mg in 0.9% sodium chloride (MBP/ADV) 250 mL  1,000 mg IntraVENous Q24H        Laboratory data and review:    Recent Labs      04/15/18   0407  04/14/18   0434  04/13/18   2333   WBC  7.8  14.8*  13.6*   HGB  8.0*  9.6*  11.2*   HCT  25.7*  29.6*  34.3*   PLT  300  402*  459*     Recent Labs      04/15/18   0407  04/14/18   0857  04/14/18   0434  04/13/18   2333   NA  138  137  137  134*   K  4.1  4.0  4.1  4.1   CL  109*  103  102  97   CO2  19*  19*  19*  24   GLU  92  138*  123*  140*   BUN  15  18  19  22*   CREA  1.09  1.43*  1.40*  1.55*   CA  7.1*  7.4*  7.0*  8.6   MG   --    --   1.4*   --    ALB  1.6*   --    --   2.6*   SGOT  29   --    --   20   ALT  25   --    --   18   INR   --    --    --   1.2*     No components found for: [place]    Other Diagnostics:    Telemetry reviewed:   normal sinus rhythm    Assessment and Plan:    [facility] (4/14/2018): improving. Blood cultures growing Gram neg rods and urine cultures isolating Gram neg rods too. Presumed urinary tract source. BP still borderline low. Give another saline fluid bolus and increase maintenance fluid rate.  Repeat blood cultures. Continue empiric IV antibiotics and follow cultures. IV vasopressors as needed. Monitor. Hydronephrosis with renal and ureteral calculous obstruction (4/14/2018): noted on CT abdomen. Seen by urology and he is sp cystoscopy and right retrograde pyelogram, and right double J stent placement. Urology following. Continue to follow. Elevated troponin (4/14/2018): type 2 MI, in the setting of shock. Continue empiric IV heparin gtt. Troponin trended down. Continue ASA, atorvastatin. Follow Echo. Cardiology consult pending      AGUEDA (acute kidney injury) ([facility] (4/14/2018)/ High anion gap metabolic acidosis (0/72/5313): suspect from septic shock, has a hx of prostate ca and now ureteric stone. Improving. Cr better. Continue IVFs and monitor. Type 2 diabetes mellitus without complication ([facility] (2/75/9279): monitor blood glucose. SSI per protocol for now. Hold metformin    Emphysema of lung ([facility] (4/14/2018): incidental on CT. Asymptomatic. Anemia: worse after hydration, suspect he has ACD. Monitor       Prostate CA: s/p robotic prostatectomy for prostate cancer at [facility] in Jan 2018.  Monitor     Total time spent for the patient's care: [address] 1952 discussed with: Patient and Nursing Staff    Discussed:  Care Plan    Prophylaxis:  SCD's    Anticipated Disposition:  Correctional facility           ___________________________________________________    Attending Physician:   Dolores Vaz MD

## 2018-04-16 LAB
APTT PPP: 40 SEC (ref 22.1–32)
APTT PPP: 76.2 SEC (ref 22.1–32)
APTT PPP: 77.6 SEC (ref 22.1–32)
BACTERIA SPEC CULT: ABNORMAL
BACTERIA SPEC CULT: NORMAL
CC UR VC: ABNORMAL
CC UR VC: NORMAL
GLUCOSE BLD STRIP.AUTO-MCNC: 102 MG/DL (ref 65–100)
GLUCOSE BLD STRIP.AUTO-MCNC: 102 MG/DL (ref 65–100)
GLUCOSE BLD STRIP.AUTO-MCNC: 103 MG/DL (ref 65–100)
GLUCOSE BLD STRIP.AUTO-MCNC: 105 MG/DL (ref 65–100)
SERVICE CMNT-IMP: ABNORMAL
SERVICE CMNT-IMP: NORMAL
THERAPEUTIC RANGE,PTTT: ABNORMAL SECS (ref 58–77)
VANCOMYCIN SERPL-MCNC: 6.2 UG/ML

## 2018-04-16 PROCEDURE — 82962 GLUCOSE BLOOD TEST: CPT

## 2018-04-16 PROCEDURE — 74011250636 HC RX REV CODE- 250/636: Performed by: INTERNAL MEDICINE

## 2018-04-16 PROCEDURE — 74011000258 HC RX REV CODE- 258: Performed by: INTERNAL MEDICINE

## 2018-04-16 PROCEDURE — 85730 THROMBOPLASTIN TIME PARTIAL: CPT | Performed by: INTERNAL MEDICINE

## 2018-04-16 PROCEDURE — 65660000000 HC RM CCU STEPDOWN

## 2018-04-16 PROCEDURE — 74011250637 HC RX REV CODE- 250/637: Performed by: INTERNAL MEDICINE

## 2018-04-16 PROCEDURE — 96374 THER/PROPH/DIAG INJ IV PUSH: CPT

## 2018-04-16 PROCEDURE — 77010033678 HC OXYGEN DAILY

## 2018-04-16 PROCEDURE — 74011250636 HC RX REV CODE- 250/636: Performed by: EMERGENCY MEDICINE

## 2018-04-16 RX ORDER — HEPARIN SODIUM 5000 [USP'U]/ML
60 INJECTION, SOLUTION INTRAVENOUS; SUBCUTANEOUS
Status: COMPLETED | OUTPATIENT
Start: 2018-04-16 | End: 2018-04-16

## 2018-04-16 RX ADMIN — Medication 10 ML: at 20:52

## 2018-04-16 RX ADMIN — PIPERACILLIN SODIUM AND TAZOBACTAM SODIUM 3.38 G: 3; .375 INJECTION, POWDER, LYOPHILIZED, FOR SOLUTION INTRAVENOUS at 04:40

## 2018-04-16 RX ADMIN — SODIUM CHLORIDE 125 ML/HR: 900 INJECTION, SOLUTION INTRAVENOUS at 04:45

## 2018-04-16 RX ADMIN — CLOPIDOGREL BISULFATE 75 MG: 75 TABLET ORAL at 09:05

## 2018-04-16 RX ADMIN — ASPIRIN 325 MG: 325 TABLET, DELAYED RELEASE ORAL at 09:06

## 2018-04-16 RX ADMIN — PIPERACILLIN SODIUM AND TAZOBACTAM SODIUM 3.38 G: 3; .375 INJECTION, POWDER, LYOPHILIZED, FOR SOLUTION INTRAVENOUS at 11:44

## 2018-04-16 RX ADMIN — PANTOPRAZOLE SODIUM 40 MG: 40 TABLET, DELAYED RELEASE ORAL at 06:04

## 2018-04-16 RX ADMIN — METOPROLOL SUCCINATE 12.5 MG: 25 TABLET, EXTENDED RELEASE ORAL at 09:05

## 2018-04-16 RX ADMIN — CEFTRIAXONE SODIUM 1 G: 1 INJECTION, POWDER, FOR SOLUTION INTRAMUSCULAR; INTRAVENOUS at 15:21

## 2018-04-16 RX ADMIN — HEPARIN SODIUM 3700 UNITS: 5000 INJECTION, SOLUTION INTRAVENOUS; SUBCUTANEOUS at 00:59

## 2018-04-16 RX ADMIN — VANCOMYCIN HYDROCHLORIDE 1000 MG: 1 INJECTION, POWDER, LYOPHILIZED, FOR SOLUTION INTRAVENOUS at 02:17

## 2018-04-16 RX ADMIN — ATORVASTATIN CALCIUM 40 MG: 20 TABLET, FILM COATED ORAL at 20:52

## 2018-04-16 RX ADMIN — PANTOPRAZOLE SODIUM 40 MG: 40 TABLET, DELAYED RELEASE ORAL at 16:30

## 2018-04-16 RX ADMIN — Medication 10 ML: at 06:04

## 2018-04-16 NOTE — DIABETES MGMT
Progress Note     Chart reviewed for low blood glucose ( < 80 mg/dL x 1 in the past 24 hours) . Recommendations/ Comments: If appropriate, please consider changing correction from normal sensitivity (starting at >139) to high sensitivity (starting at >199) . Morning glucose: 105 mg/dL (per am POCT Glucose). Required 0 units of correction in the last 24 hours. Inpatient medications for glucose management:  1. Correction Scale: Lispro (Humalog) Normal Sensitivity scale to cover for glucose > 139 mg/dL before meals      POC Glucose last 24hrs:   Lab Results   Component Value Date/Time    GLUCPOC 105 (H) 04/16/2018 07:23 AM    GLUCPOC 101 (H) 04/15/2018 04:37 PM    GLUCPOC 82 04/15/2018 12:20 PM        Estimated Creatinine Clearance: 55.8 mL/min (based on Cr of 1.09). Diet order:   Active Orders   Diet    DIET NPO      PO intake: Patient Vitals for the past 72 hrs:   % Diet Eaten   04/15/18 1700 50 %   04/15/18 1200 50 %       History of Diabetes:   Luis Pelletier is a 69 y.o. male with a past medical history significant for DM per Darren Wayne MD's H&P dated 4/14/2018. Prior to admission medications for diabetes: per past medical records  Metformin 250mg twice a day with meals     A1C:   Lab Results   Component Value Date/Time    Hemoglobin A1c 5.8 04/15/2018 04:07 AM       Reference range*:  Increased risk for diabetes: 5.7 - 6.4%  Diabetes: >6.4%  Glycemic control for adults with diabetes: <7.0 %    *RAUL ENRIQUEZ (2014). Diagnosis and classification of diabetes mellitus. Diabetes care, 37, S81. Thank you. Memory Closs. Giancarlo MPH, RN, BSN, [address]   [phone]    -For most hospitalized persons with hyperglycemia in the intensive care unit (ICU), a glucose range of 140 to 180 mg/dL is recommended, provided this target can be safely achieved. *  - For general medicine and surgery patients in non-ICU settings, a premeal glucose target <140 mg/dL and a random blood glucose <180 mg/dL are recommended. *    JOSHUA Corral, Myles Jeffries., Kal Paniagua, ... & Shirley Min ([id number]). AMERICAN ASSOCIATION OF CLINICAL ENDOCRINOLOGISTS AND AMERICAN COLLEGE OF ENDOCRINOLOGY-CLINICAL PRACTICE GUIDELINES FOR DEVELOPING A DIABETES MELLITUS COMPREHENSIVE CARE PLAN-2015-EXECUTIVE SUMMARY: Complete guidelines are available at https://www. aace. com/publications/guidelines. Endocrine Practice, 21(4), U9959516.

## 2018-04-16 NOTE — PROGRESS NOTES
0000 - Collected pt ptt    0055 - Pt ptt resulted, rate adjusted, bolus ordered    0600 - Ptt collected    0700 - Called lab to find out stat ptt results and per Gemma Cain lab received at 0630 and it takes an hour to process so results should be done around 0730\"

## 2018-04-16 NOTE — PROGRESS NOTES
1015  Echo tech in to do echo. 1442  Triple lumen removed and the patient was instructed to lay flat for 30 minutes.      1523  [place] catheter removed and patient instructed to urinate by 9:15pm.

## 2018-04-16 NOTE — PROGRESS NOTES
[address]   YOB: 1950          Assessment & Plan:   1. AGUEDA vs CKD  · Cr 1.4 mg to 1.09 mg here  · Recovering  · WE WILL SEE PRN,Call with ? · Needs UA, Urine prt/cr checked in 1 month at PCP  2. Septic Shock  · Urine source likely  · Antibiotics,ivf   3. Hydro with stone  · S/p stent  4. DM  5. ANEMIA  6. H/O CA Prostate       Subjective:   CC:arf  HPI: Patient seen   AGUEDA is better  Shock is better  On ABX for pyelo,s/p stent  ROS:neg for 12 sys  Current Facility-Administered Medications   Medication Dose Route Frequency    vancomycin (VANCOCIN) 1,000 mg in 0.9% sodium chloride (MBP/ADV) 250 mL  1,000 mg IntraVENous Q12H    aspirin delayed-release tablet 325 mg  325 mg Oral DAILY    clopidogrel (PLAVIX) tablet 75 mg  75 mg Oral DAILY    metoprolol succinate (TOPROL-XL) XL tablet 12.5 mg  12.5 mg Oral DAILY    sodium chloride (NS) flush 5-10 mL  5-10 mL IntraVENous Q8H    sodium chloride (NS) flush 5-10 mL  5-10 mL IntraVENous PRN    naloxone (NARCAN) injection 0.4 mg  0.4 mg IntraVENous PRN    NOREPINephrine (LEVOPHED) 4 mg in dextrose 5% 250 mL infusion  2-16 mcg/min IntraVENous TITRATE    heparin 25,000 units in D5W 250 ml infusion  12-25 Units/kg/hr IntraVENous TITRATE    0.9% sodium chloride infusion  125 mL/hr IntraVENous CONTINUOUS    pantoprazole (PROTONIX) tablet 40 mg  40 mg Oral ACB&D    insulin lispro (HUMALOG) injection   SubCUTAneous TIDAC    glucose chewable tablet 16 g  4 Tab Oral PRN    dextrose (D50W) injection syrg 12.5-25 g  12.5-25 g IntraVENous PRN    glucagon (GLUCAGEN) injection 1 mg  1 mg IntraMUSCular PRN    atorvastatin (LIPITOR) tablet 40 mg  40 mg Oral QHS    piperacillin-tazobactam (ZOSYN) 3.375 g in 0.9% sodium chloride (MBP/ADV) 100 mL  3.375 g IntraVENous Q8H          Objective:     Vitals:  Blood pressure 106/61, pulse 86, temperature 98.1 °F (36.7 °C), resp.  rate 18, height 5' 11\" (1.803 m), weight 61.7 kg (136 lb), SpO2 94 %. Temp (24hrs), Av.1 °F (36.7 °C), Min:97.8 °F (36.6 °C), Max:98.4 °F (36.9 °C)      Intake and Output:   07 - 1900  In: -   Out: 649 [QDWIQ:895]  1901 -  07  In: 435.2 [P.O.:360; I.V.:75.2]  Out: 1800 [Urine:1800]    Physical Exam:                Patient is intubated:  no    Physical Examination:   GENERAL ASSESSMENT: NAD  HEENT:Nontraumatic   CHEST: CTA  HEART: S1S2  ABDOMEN: Soft,NT,     EXTREMITY: EDEMA  NEURO:Grossly non focal          ECG/rhythm:    Data Review      No results for input(s): TNIPOC in the last 72 hours. No lab exists for component: ITNL   Recent Labs      04/15/18   0407  18   2334  18   0857   TROIQ  1.35*  1.75*  3.15*     Recent Labs      04/15/18   0407  18   0857  18   0434  18   2333   NA  138  137  137  134*   K  4.1  4.0  4.1  4.1   CL  109*  103  102  97   CO2  19*  19*  19*  24   BUN  15  18  19  22*   CREA  1.09  1.43*  1.40*  1.55*   GLU  92  138*  123*  140*   MG   --    --   1.4*   --    CA  7.1*  7.4*  7.0*  8.6   ALB  1.6*   --    --   2.6*   WBC  7.8   --   14.8*  13.6*   HGB  8.0*   --   9.6*  11.2*   HCT  25.7*   --   29.6*  34.3*   PLT  300   --   402*  459*      Recent Labs      18   0548  04/15/18   2215  04/15/18   1755   18   2333   INR   --    --    --    --   1.2*   PTP   --    --    --    --   12.0*   APTT  77.6*  40.0*  45.8*   < >   --     < > = values in this interval not displayed. Needs: urine analysis, urine sodium, protein and creatinine  No results found for: Ekaterina Covington      Discussed with:  pt    : Zeeshan Boyce MD  2018        [facility]:  [email or web address]  Delbert Bhatia office:  [address] [address]  [place], [address] [place] [address]  Phone: [phone]  Fax :     [phone]    [facility] office:  [address] [facility], [facility]  Phone - [phone]  Fax - [phone]

## 2018-04-16 NOTE — PROGRESS NOTES
[facility] Dosing Services: Antimicrobial Stewardship Daily Doc    Consult for antibiotic dosing of vancomycin by Dr. Natalie Crespo  Indication: Septic shock/Hydronephrosis with renal and ureteral obstruction   Day of Therapy 3    Ht Readings from Last 1 Encounters:   04/14/18 180.3 cm (71\")        Wt Readings from Last 1 Encounters:   04/14/18 61.7 kg (136 lb)      Vancomycin therapy:  Current maintenance dose: 1000 mg every 24 hours. Dose calculated to approximate a therapeutic trough of 15-20 mcg/mL. Last random level 6.2 mcg/ml 4/15 @ 2000, drawn ~19 hours after last dose, calculates to 4.04 mcg/ml. Plan for level / Adjustment in Therapy: Will increase dose to 1000 mg every 12 hours for a predicted trough of 15.29 mcg/ml. Dose administration notes:   Doses given appropriately as scheduled      Other Antimicrobial   (not dosed by pharmacist) Zosyn 3.375g IV every 8 hours   Cultures 4/15/2018: Blood: NG x1 days pending  4/14/2018: Urine: >100,000 GNR pending  4/14/2018: Urine X 2: no growth final  4/14/2018: Resp panel: none detected final  4/13/2018: Blood: 3/4 bottles with GNR pending  Dr. Juan Singh hospitalist - notes baseline Cr 1. Last U/C grew proteus sensitive to Rocephin and MRSA    Serum Creatinine Lab Results   Component Value Date/Time    Creatinine 1.09 04/15/2018 04:07 AM         Creatinine Clearance Estimated Creatinine Clearance: 55.8 mL/min (based on Cr of 1.09).      Temp Temp: 98.1 °F (36.7 °C)       WBC Lab Results   Component Value Date/Time    WBC 7.8 04/15/2018 04:07 AM        H/H Lab Results   Component Value Date/Time    HGB 8.0 (L) 04/15/2018 04:07 AM        Platelets    Lab Results   Component Value Date/Time    PLATELET 033 50/51/1717 04:07 AM            Pharmacist Angeline Holguin Contact information: [id number]

## 2018-04-16 NOTE — PROGRESS NOTES
Bedside and Verbal shift change report given to Juan Jimenez (oncoming nurse) by Stefan Mckinley (offgoing nurse). Report included the following information SBAR, Kardex, Intake/Output, MAR, Accordion and Recent Results.

## 2018-04-16 NOTE — PROGRESS NOTES
4- CASE MANAGEMENT NOTE:  This pt was admitted from [place] [address] and is scheduled to return upon discharge.     Care Management Interventions  PCP Verified by CM: No  Current Support Network: [address] [place] [address]  Confirm Follow Up Transport:  ([address] [place] [address])  Discharge Location  Discharge Placement:  (Return to [place] [address])    Dimitri Jasmine, VERA, CM

## 2018-04-16 NOTE — PROGRESS NOTES
Aung Peralta phillip Rigby 77  [address] [facility], [address] [place] [address]  [phone]      Medical Progress Note      NAME:         Devonte Martinez   :        1950  MRM:        [id number]    Date:          2018      Subjective: Patient has been seen and examined as a follow up for septic shock due to a urinary tract source. Chart, labs, diagnostics reviewed. He says he feels well and denies any abdominal discomfort, nausea or vomiting. No fever or chills. Objective:    Vital Signs:    Visit Vitals    /72 (BP 1 Location: Right arm, BP Patient Position: At rest;Supine)    Pulse 82    Temp 98 °F (36.7 °C)    Resp 18    Ht 5' 11\" (1.803 m)    Wt 61.7 kg (136 lb)    SpO2 95%    BMI 18.97 kg/m2          Intake/Output Summary (Last 24 hours) at 18 1348  Last data filed at 18 1117   Gross per 24 hour   Intake              120 ml   Output             2150 ml   Net            -2030 ml      Physical Examination:    General: Weak looking and not in much acute distress   Eyes:   pink conjunctivae, PERRLA with no discharge. ENT:   no ottorrhea or rhinorrhea with moist mucous membranes  Pulm:  clear breath sounds without crackles or wheezes  Card:  has regular and normal S1, S2 without thrills, bruits   Abd:  Soft, non-tender, non-distended, normoactive bowel sounds   Musc:  No cyanosis, clubbing, atrophy or deformities. Skin:  No rashes, bruising or ulcers. Neuro: Awake and alert, no focal deficit.       Current Facility-Administered Medications   Medication Dose Route Frequency    aspirin delayed-release tablet 325 mg  325 mg Oral DAILY    metoprolol succinate (TOPROL-XL) XL tablet 12.5 mg  12.5 mg Oral DAILY    sodium chloride (NS) flush 5-10 mL  5-10 mL IntraVENous Q8H    sodium chloride (NS) flush 5-10 mL  5-10 mL IntraVENous PRN    naloxone (NARCAN) injection 0.4 mg  0.4 mg IntraVENous PRN    NOREPINephrine (LEVOPHED) 4 mg in dextrose 5% 250 mL infusion  2-16 mcg/min IntraVENous TITRATE    0.9% sodium chloride infusion  125 mL/hr IntraVENous CONTINUOUS    pantoprazole (PROTONIX) tablet 40 mg  40 mg Oral ACB&D    insulin lispro (HUMALOG) injection   SubCUTAneous TIDAC    glucose chewable tablet 16 g  4 Tab Oral PRN    dextrose (D50W) injection syrg 12.5-25 g  12.5-25 g IntraVENous PRN    glucagon (GLUCAGEN) injection 1 mg  1 mg IntraMUSCular PRN    atorvastatin (LIPITOR) tablet 40 mg  40 mg Oral QHS    piperacillin-tazobactam (ZOSYN) 3.375 g in 0.9% sodium chloride (MBP/ADV) 100 mL  3.375 g IntraVENous Q8H        Laboratory data and review:    Recent Labs      04/15/18   0407  04/14/18   0434  04/13/18   2333   WBC  7.8  14.8*  13.6*   HGB  8.0*  9.6*  11.2*   HCT  25.7*  29.6*  34.3*   PLT  300  402*  459*     Recent Labs      04/15/18   0407  04/14/18   0857  04/14/18   0434  04/13/18   2333   NA  138  137  137  134*   K  4.1  4.0  4.1  4.1   CL  109*  103  102  97   CO2  19*  19*  19*  24   GLU  92  138*  123*  140*   BUN  15  18  19  22*   CREA  1.09  1.43*  1.40*  1.55*   CA  7.1*  7.4*  7.0*  8.6   MG   --    --   1.4*   --    ALB  1.6*   --    --   2.6*   SGOT  29   --    --   20   ALT  25   --    --   18   INR   --    --    --   1.2*     No components found for: [place]    Other Diagnostics:    Telemetry reviewed:   normal sinus rhythm    Assessment and Plan:    Septic shock due to a UTI ([facility] (4/14/2018): resolved. Blood and urine cultures grew Proteus mirabilis. Repeat blood cultures neg. DC IV Zosyn and start IV ceftriaxone while here. DC dyson and central line    Hydronephrosis with renal and ureteral calculous obstruction (4/14/2018): noted on CT abdomen. Seen by urology and he is sp cystoscopy and right retrograde pyelogram and right double J stent placement. Seen and followed by urology.  Out patient follow up      Elevated troponin (4/14/2018): type 2 MI, in the setting of septic shock. Seen by cardiology who recommend medical management. Echo showed a normal EF with a possible small ASD. Continue ASA, atorvastatin, metoprolol. Type 2 diabetes mellitus without complication ([facility] (8/59/0443): monitor blood glucose. SSI per protocol for now. Continue to hold metformin    [facility] (4[facility]14/2018)/ High anion gap metabolic acidosis (7/64/5360): suspect from septic shock, has a hx of prostate ca and now ureteric stone. Improving. Cr now normal DC fluids. [facility]phy[facility] lung [facility] (4/14/2018): incidental on CT. Asymptomatic. Anemia: worse after hydration, suspect he has ACD. Monitor Hgb        Prostate CA: s/p robotic prostatectomy for prostate cancer at [facility] in Jan 2018.  Monitor     Total time spent for the patient's care: [address] discussed with: Patient and Nursing Staff    Discussed:  Care Plan    Prophylaxis:  SCD's    Anticipated Disposition:  Correctional facility           ___________________________________________________    Attending Physician:   Earnestine Painting MD

## 2018-04-16 NOTE — ROUTINE PROCESS
Bedside shift change report given to Prashanth Valadez (oncoming nurse) by Sabra Trujillo (offgoing nurse). Report included the following information SBAR, Kardex and MAR.

## 2018-04-16 NOTE — PROGRESS NOTES
TRANSFER - IN REPORT:    Verbal report received from Jeniffer Novak (name) on Deepti Amor  being received from ICU (unit) for routine progression of care      Report consisted of patients Situation, Background, Assessment and   Recommendations(SBAR). Information from the following report(s) SBAR, Kardex, Intake/Output, MAR, Accordion and Recent Results was reviewed with the receiving nurse. Opportunity for questions and clarification was provided. Assessment completed upon patients arrival to unit and care assumed.      Primary Nurse Samuel Kimball and Samia Rodriguez, RN performed a dual skin assessment on this patient No impairment noted  Prasad score is 20

## 2018-04-16 NOTE — PROGRESS NOTES
Problem: Falls - Risk of  Goal: *Absence of Falls  Document Primo Fall Risk and appropriate interventions in the flowsheet.    Outcome: Progressing Towards Goal  Fall Risk Interventions:            Medication Interventions: Patient to call before getting OOB, Teach patient to arise slowly, Utilize gait belt for transfers/ambulation, Bed/chair exit alarm    Elimination Interventions: Call light in reach, Bed/chair exit alarm, Patient to call for help with toileting needs

## 2018-04-16 NOTE — PROGRESS NOTES
Cardiology Progress Note                             [address] [facility]. [address], [place], [address] [facility]                                 Phone [phone]; Fax [phone]        2018 12:12 PM     Admit Date:           2018  Admit Diagnosis:  Severe sepsis ([address]  URETERAL STONE  :          1950   MRN:          [id number]   ASSESSMENT/RECOMMENDATION:   1)Elevated Troponin: peak 3.15, now trending down. Also in the setting on AGUEDA and sepsis. No chest pain. - no acute ST changes on ECG. - started on heparin gtt on admission and plavix. Will d/c heparin gtt and plavix now as hgb continues to trending down.   -hold off on further ischemic work up for now. Normal LVEF per echo with mild TR. Will medically manage for now. Will need a cardiac cath at some point. -lipids LDL 49/HDL 22/TC 92-  -continue statin. Tolerating Toprol, hold parameters for Hr <60 bpm and SBP <110       Sepsis/pyonephrosis: gram neg rods 3/4 BC bottles on broad spectrum antibiotics  -source pyonephrosis from obstructing stone s/p stent. Urology following. AGUEDA resolved     Anemia, fe deficient: Hgb 11.2> 8.0.  -heparin gtt and plavix d/c continue to monitor. Occult blood? Pt personally seen and examined. Chart reviewed. Agree with advanced NP's history, exam and  A/P with changes/additons. CVS-S1-S2 present, no murmur    2/6 systolic murmur present  RS-   CTAB       Abdomen-  Obese/soft/NT  LE-   no edema    Discussed with patient/nursing    Didi Hadley MD, [facility]               07 - 1900  In: -   Out: 950 [Urine:950]    Last 3 Recorded Weights in this Encounter    18 2335 18 0532   Weight: 136 lb 3.9 oz (61.8 kg) 136 lb (61.7 kg)         1901 -  07  In: 435.2 [P.O.:360; I.V.:75.2]  Out: 1800 [Urine:1800]    NIKOLAI Bledsoe is a 69 y.o. male admitted for hydronephrosis with renal and ureteral obstruction now s/p stent placement noted to have a troponin level of 3 on admission that has been downtrending. EKG nonischemic. Denies chest pain, SOB. Previous heavy smoker. Started on heparin gtt on admission. Morteza López denies palpitations, irregular heart beat, SOB, chest pain or LE edema. No lightheadedness or dizziness  No N/V/D.          Current Facility-Administered Medications   Medication Dose Route Frequency    vancomycin (VANCOCIN) 1,000 mg in 0.9% sodium chloride (MBP/ADV) 250 mL  1,000 mg IntraVENous Q12H    aspirin delayed-release tablet 325 mg  325 mg Oral DAILY    clopidogrel (PLAVIX) tablet 75 mg  75 mg Oral DAILY    metoprolol succinate (TOPROL-XL) XL tablet 12.5 mg  12.5 mg Oral DAILY    sodium chloride (NS) flush 5-10 mL  5-10 mL IntraVENous Q8H    sodium chloride (NS) flush 5-10 mL  5-10 mL IntraVENous PRN    naloxone (NARCAN) injection 0.4 mg  0.4 mg IntraVENous PRN    NOREPINephrine (LEVOPHED) 4 mg in dextrose 5% 250 mL infusion  2-16 mcg/min IntraVENous TITRATE    heparin 25,000 units in D5W 250 ml infusion  12-25 Units/kg/hr IntraVENous TITRATE    0.9% sodium chloride infusion  125 mL/hr IntraVENous CONTINUOUS    pantoprazole (PROTONIX) tablet 40 mg  40 mg Oral ACB&D    insulin lispro (HUMALOG) injection   SubCUTAneous TIDAC    glucose chewable tablet 16 g  4 Tab Oral PRN    dextrose (D50W) injection syrg 12.5-25 g  12.5-25 g IntraVENous PRN    glucagon (GLUCAGEN) injection 1 mg  1 mg IntraMUSCular PRN    atorvastatin (LIPITOR) tablet 40 mg  40 mg Oral QHS    piperacillin-tazobactam (ZOSYN) 3.375 g in 0.9% sodium chloride (MBP/ADV) 100 mL  3.375 g IntraVENous Q8H      OBJECTIVE               Intake/Output Summary (Last 24 hours) at 04/16/18 1212  Last data filed at 04/16/18 1117   Gross per 24 hour   Intake              120 ml   Output             2150 ml   Net            -2030 ml       Review of Systems - History obtained from the patient AS PER  HPI    Telemetry NSR    PHYSICAL EXAM        Visit Vitals    /72 (BP 1 Location: Right arm, BP Patient Position: At rest;Supine)    Pulse 82    Temp 98 °F (36.7 °C)    Resp 18    Ht 5' 11\" (1.803 m)    Wt 136 lb (61.7 kg)    SpO2 95%    BMI 18.97 kg/m2       Gen: Well-developed, well-nourished, in no acute distress  alert and oriented x 3  HEENT:  Pink conjunctivae, Hearing grossly normal.No scleral icterus or conjunctival, moist mucous membranes  Neck: Supple,No JVD,   No cervical lymphadenopathy  Resp: No accessory muscle use, Clear breath sounds, No rales or rhonchi  Card: Regular Rate,Rythm, No murmurs, rubs or gallop. No thrills.    GI:          soft, non-tender   MSK: No cyanosis or clubbing, good capillary refill  Skin: No rashes or ulcers, no bruising  Neuro:  Cranial nerves are grossly intact, moving all four extremities, no focal deficit, follows commands appropriately  Psych:  Good insight, oriented to person, place and time, alert, Nml Affect  LE: No edema       DATA REVIEW            Laboratory and Imaging have been reviewed by me and are notable for  Recent Labs      04/15/18   0407  04/14/18   2334  04/14/18   0857   TROIQ  1.35*  1.75*  3.15*     Recent Labs      04/15/18   0407  04/14/18   0857  04/14/18   0434  04/13/18   2333   NA  138  137  137  134*   K  4.1  4.0  4.1  4.1   CO2  19*  19*  19*  24   BUN  15  18  19  22*   CREA  1.09  1.43*  1.40*  1.55*   GLU  92  138*  123*  140*   MG   --    --   1.4*   --    WBC  7.8   --   14.8*  13.6*   HGB  8.0*   --   9.6*  11.2*   HCT  25.7*   --   29.6*  34.3*   PLT  300   --   402*  459*             Michelle Villavicencio NP

## 2018-04-17 LAB
APTT PPP: 34.9 SEC (ref 22.1–32)
BACTERIA SPEC CULT: NORMAL
BACTERIA SPEC CULT: NORMAL
BASOPHILS # BLD: 0 K/UL (ref 0–0.1)
BASOPHILS NFR BLD: 1 % (ref 0–1)
DIFFERENTIAL METHOD BLD: ABNORMAL
EOSINOPHIL # BLD: 0.2 K/UL (ref 0–0.4)
EOSINOPHIL NFR BLD: 3 % (ref 0–7)
ERYTHROCYTE [DISTWIDTH] IN BLOOD BY AUTOMATED COUNT: 17.2 % (ref 11.5–14.5)
GLUCOSE BLD STRIP.AUTO-MCNC: 113 MG/DL (ref 65–100)
GLUCOSE BLD STRIP.AUTO-MCNC: 115 MG/DL (ref 65–100)
GLUCOSE BLD STRIP.AUTO-MCNC: 116 MG/DL (ref 65–100)
GLUCOSE BLD STRIP.AUTO-MCNC: 91 MG/DL (ref 65–100)
HCT VFR BLD AUTO: 29.2 % (ref 36.6–50.3)
HGB BLD-MCNC: 9.6 G/DL (ref 12.1–17)
IMM GRANULOCYTES # BLD: 0 K/UL (ref 0–0.04)
IMM GRANULOCYTES NFR BLD AUTO: 1 % (ref 0–0.5)
LYMPHOCYTES # BLD: 1.3 K/UL (ref 0.8–3.5)
LYMPHOCYTES NFR BLD: 21 % (ref 12–49)
MCH RBC QN AUTO: 25.4 PG (ref 26–34)
MCHC RBC AUTO-ENTMCNC: 32.9 G/DL (ref 30–36.5)
MCV RBC AUTO: 77.2 FL (ref 80–99)
MONOCYTES # BLD: 0.4 K/UL (ref 0–1)
MONOCYTES NFR BLD: 7 % (ref 5–13)
NEUTS SEG # BLD: 4.2 K/UL (ref 1.8–8)
NEUTS SEG NFR BLD: 69 % (ref 32–75)
NRBC # BLD: 0 K/UL (ref 0–0.01)
NRBC BLD-RTO: 0 PER 100 WBC
PLATELET # BLD AUTO: 388 K/UL (ref 150–400)
PMV BLD AUTO: 8.9 FL (ref 8.9–12.9)
RBC # BLD AUTO: 3.78 M/UL (ref 4.1–5.7)
SERVICE CMNT-IMP: ABNORMAL
SERVICE CMNT-IMP: NORMAL
SERVICE CMNT-IMP: NORMAL
THERAPEUTIC RANGE,PTTT: ABNORMAL SECS (ref 58–77)
WBC # BLD AUTO: 6.2 K/UL (ref 4.1–11.1)

## 2018-04-17 PROCEDURE — 74011250637 HC RX REV CODE- 250/637: Performed by: INTERNAL MEDICINE

## 2018-04-17 PROCEDURE — 82962 GLUCOSE BLOOD TEST: CPT

## 2018-04-17 PROCEDURE — 85025 COMPLETE CBC W/AUTO DIFF WBC: CPT | Performed by: INTERNAL MEDICINE

## 2018-04-17 PROCEDURE — 74011250636 HC RX REV CODE- 250/636: Performed by: INTERNAL MEDICINE

## 2018-04-17 PROCEDURE — 65660000000 HC RM CCU STEPDOWN

## 2018-04-17 PROCEDURE — 36415 COLL VENOUS BLD VENIPUNCTURE: CPT | Performed by: INTERNAL MEDICINE

## 2018-04-17 PROCEDURE — 74011250637 HC RX REV CODE- 250/637: Performed by: NURSE PRACTITIONER

## 2018-04-17 PROCEDURE — 85730 THROMBOPLASTIN TIME PARTIAL: CPT | Performed by: INTERNAL MEDICINE

## 2018-04-17 PROCEDURE — 74011000258 HC RX REV CODE- 258: Performed by: INTERNAL MEDICINE

## 2018-04-17 RX ADMIN — PANTOPRAZOLE SODIUM 40 MG: 40 TABLET, DELAYED RELEASE ORAL at 06:20

## 2018-04-17 RX ADMIN — PANTOPRAZOLE SODIUM 40 MG: 40 TABLET, DELAYED RELEASE ORAL at 15:35

## 2018-04-17 RX ADMIN — Medication 10 ML: at 06:21

## 2018-04-17 RX ADMIN — METOPROLOL SUCCINATE: 25 TABLET, EXTENDED RELEASE ORAL at 09:05

## 2018-04-17 RX ADMIN — CEFTRIAXONE SODIUM 1 G: 1 INJECTION, POWDER, FOR SOLUTION INTRAMUSCULAR; INTRAVENOUS at 15:35

## 2018-04-17 RX ADMIN — ASPIRIN 325 MG: 325 TABLET, DELAYED RELEASE ORAL at 09:03

## 2018-04-17 RX ADMIN — ATORVASTATIN CALCIUM 40 MG: 20 TABLET, FILM COATED ORAL at 20:26

## 2018-04-17 RX ADMIN — Medication 10 ML: at 20:26

## 2018-04-17 NOTE — ADVANCED PRACTICE NURSE
Cath lab notified of pts refusal of cath. Updated Dr Alon Baez. Add: D/w patient. Patient does not want to undergo cardiac catheterization. He wants to continue medications. No chest pain. Continue aspirin/statin. Will sign off. Please call if needed. Franc Kimball MD, [facility]

## 2018-04-17 NOTE — PROGRESS NOTES
[facility] [address], [place], [address] [facility]  [phone]      Medical Progress Note      NAME:         Shelley Aguiar   :          MRM:        [id number]    Date:          2018      Subjective: no acute events. Denies cp, sob, abd pain. +BM    Objective:    Vital Signs:    Visit Vitals    /80 (BP 1 Location: Right arm, BP Patient Position: At rest)    Pulse 88    Temp 98.1 °F (36.7 °C)    Resp 19    Ht 5' 11\" (1.803 m)    Wt 61.7 kg (136 lb)    SpO2 92%    BMI 18.97 kg/m2          Intake/Output Summary (Last 24 hours) at 18 1204  Last data filed at 18 1523   Gross per 24 hour   Intake                0 ml   Output              400 ml   Net             -400 ml      Physical Examination:    General: Weak looking and not in much acute distress   Eyes:   pink conjunctivae, PERRLA with no discharge. ENT:   no ottorrhea or rhinorrhea with moist mucous membranes  Pulm:  clear breath sounds without crackles or wheezes  Card:  has regular and normal S1, S2 without thrills, bruits   Abd:  Soft, non-tender, non-distended, normoactive bowel sounds   Musc:  No cyanosis, clubbing, atrophy or deformities. Skin:  No rashes, bruising or ulcers. Neuro: Awake and alert, no focal deficit.       Current Facility-Administered Medications   Medication Dose Route Frequency    cefTRIAXone (ROCEPHIN) 1 g in 0.9% sodium chloride (MBP/ADV) 50 mL  1 g IntraVENous Q24H    aspirin delayed-release tablet 325 mg  325 mg Oral DAILY    metoprolol succinate (TOPROL-XL) XL tablet 12.5 mg  12.5 mg Oral DAILY    sodium chloride (NS) flush 5-10 mL  5-10 mL IntraVENous Q8H    sodium chloride (NS) flush 5-10 mL  5-10 mL IntraVENous PRN    naloxone (NARCAN) injection 0.4 mg  0.4 mg IntraVENous PRN    NOREPINephrine (LEVOPHED) 4 mg in dextrose 5% 250 mL infusion  2-16 mcg/min IntraVENous TITRATE    pantoprazole (PROTONIX) tablet 40 mg  40 mg Oral ACB&D    insulin lispro (HUMALOG) injection   SubCUTAneous TIDAC    glucose chewable tablet 16 g  4 Tab Oral PRN    dextrose (D50W) injection syrg 12.5-25 g  12.5-25 g IntraVENous PRN    glucagon (GLUCAGEN) injection 1 mg  1 mg IntraMUSCular PRN    atorvastatin (LIPITOR) tablet 40 mg  40 mg Oral QHS        Laboratory data and review:    Recent Labs      04/17/18   0338  04/15/18   0407   WBC  6.2  7.8   HGB  9.6*  8.0*   HCT  29.2*  25.7*   PLT  388  300     Recent Labs      04/15/18   0407   NA  138   K  4.1   CL  109*   CO2  19*   GLU  92   BUN  15   CREA  1.09   CA  7.1*   ALB  1.6*   SGOT  29   ALT  25     No components found for: [place]    Other Diagnostics:    Telemetry reviewed:   normal sinus rhythm    Assessment and Plan:    Septic shock due to a UTI: resolved. Blood and urine cultures grew Proteus mirabilis. Repeat blood cultures neg. Continue CTX. Hydronephrosis with renal and ureteral calculous obstruction (4/14/2018): noted on CT abdomen. Seen by urology and he is sp cystoscopy and right retrograde pyelogram and right double J stent placement. Seen and followed by urology. Out patient follow up      Elevated troponin (4/14/2018): type 2 MI, in the setting of septic shock. Seen by cardiology who recommend medical management. Echo showed a normal EF with a possible small ASD. Continue ASA, atorvastatin, metoprolol. Type 2 diabetes mellitus without complication ([address] (6/03/3508): monitor blood glucose. SSI per protocol for now. Continue to hold metformin    AGUEDA (acute kidney injury) ([address] (4/14/2018)/ High anion gap metabolic acidosis (5/49/0976): suspect from septic shock, has a hx of prostate ca and now ureteric stone. Improving. Cr now normal DC fluids. Emphysema of lung ([address] (4/14/2018): incidental on CT. Asymptomatic. Anemia: worse after hydration, suspect he has ACD.  Monitor Hgb        Prostate CA: s/p robotic prostatectomy for prostate cancer at [facility] in Jan 2018.  Monitor     Total time spent for the patient's care: 5 [address] discussed with: Patient and Nursing Staff    Discussed:  Care Plan    Prophylaxis:  SCD's    Anticipated Disposition:  Correctional facility           ___________________________________________________    Attending Physician:   Fannie Garza MD

## 2018-04-17 NOTE — PROGRESS NOTES
Cardiology Progress Note 5th floor                             [address] [facility], [place], [address] [facility]                                 Phone [phone]; Fax [phone]        2018     Admit Date:           2018  Admit Diagnosis:  Severe sepsis ([address]  URETERAL STONE  :          1950   MRN:          [id number]   ASSESSMENT/RECOMMENDATION:   1)Elevated Troponin: peak 3.15, now trending down. Also in the setting on AGUEDA and sepsis. No chest pain. - no acute ST changes on ECG. - started on heparin gtt on admission and plavix. Stopped heparin and plavix yesterday as hgb continued to trending down. Better his am 9.5   -hold off on further ischemic work up for now. Will need a cardiac cath at some point.   - Normal LVEF per echo with mild TR. Will medically manage for now. -lipids LDL 49/HDL 22/TC 92-  -continue statin/BB w parameters     2. Sepsis/pyonephrosis:   - gram neg rods 3/4 BC bottles on broad spectrum antibiotics  -source pyonephrosis from obstructing stone s/p stent. Urology following. 3.  AGUEDA   - resolved     4. Anemia:  - per attending                           Last 3 Recorded Weights in this Encounter    18 2335 18 0532   Weight: 136 lb 3.9 oz (61.8 kg) 136 lb (61.7 kg)         04/15 1901 -  0700  In: -   Out: 1157 [Urine:1650]    SUBJECTIVE         Jimbo Suarez is a 69 y.o. male admitted for hydronephrosis with renal and ureteral obstruction now s/p stent placement noted to have a troponin level of 3 on admission that has been downtrending. EKG nonischemic. Denies chest pain, SOB. Previous heavy smoker. Started on heparin gtt on admission. Jimbo Suarez denies palpitations, irregular heart beat, SOB, chest pain or LE edema. No lightheadedness or dizziness  No N/V/D.          Current Facility-Administered Medications   Medication Dose Route Frequency  cefTRIAXone (ROCEPHIN) 1 g in 0.9% sodium chloride (MBP/ADV) 50 mL  1 g IntraVENous Q24H    aspirin delayed-release tablet 325 mg  325 mg Oral DAILY    metoprolol succinate (TOPROL-XL) XL tablet 12.5 mg  12.5 mg Oral DAILY    sodium chloride (NS) flush 5-10 mL  5-10 mL IntraVENous Q8H    sodium chloride (NS) flush 5-10 mL  5-10 mL IntraVENous PRN    naloxone (NARCAN) injection 0.4 mg  0.4 mg IntraVENous PRN    NOREPINephrine (LEVOPHED) 4 mg in dextrose 5% 250 mL infusion  2-16 mcg/min IntraVENous TITRATE    pantoprazole (PROTONIX) tablet 40 mg  40 mg Oral ACB&D    insulin lispro (HUMALOG) injection   SubCUTAneous TIDAC    glucose chewable tablet 16 g  4 Tab Oral PRN    dextrose (D50W) injection syrg 12.5-25 g  12.5-25 g IntraVENous PRN    glucagon (GLUCAGEN) injection 1 mg  1 mg IntraMUSCular PRN    atorvastatin (LIPITOR) tablet 40 mg  40 mg Oral QHS      OBJECTIVE               Intake/Output Summary (Last 24 hours) at 04/17/18 0811  Last data filed at 04/16/18 1523   Gross per 24 hour   Intake                0 ml   Output              650 ml   Net             -650 ml       Review of Systems - History obtained from the patient AS PER  HPI    Telemetry NSR    PHYSICAL EXAM        Visit Vitals    /74 (BP 1 Location: Right arm, BP Patient Position: At rest)    Pulse 84    Temp 98.4 °F (36.9 °C)    Resp 20    Ht 5' 11\" (1.803 m)    Wt 136 lb (61.7 kg)    SpO2 94%    BMI 18.97 kg/m2       Gen: Well-developed, well-nourished, in no acute distress  alert and oriented x 3  HEENT:  Pink conjunctivae, Hearing grossly normal. Moist mucous membranes  Neck: Supple,No JVD,   No cervical lymphadenopathy  Resp: No accessory muscle use, Clear breath sounds, No rales or rhonchi  Card: Regular Rate,Rythm, 2/6 ALICE, rubs or gallop. No thrills.    GI:          BS +, soft, non-tender   Skin: No rashes or ulcers, no bruising  Neuro:  Moving all four extremities, no focal deficit, follows commands appropriately  Psych:  Good insight, oriented to person, place and time, alert, Nml Affect  LE: No edema       DATA REVIEW            Laboratory and Imaging have been reviewed by me and are notable for  Recent Labs      04/15/18   0407  04/14/18   2334  04/14/18   0857   TROIQ  1.35*  1.75*  3.15*     Recent Labs      04/17/18   0338  04/15/18   0407  04/14/18   0857   NA   --   138  137   K   --   4.1  4.0   CO2   --   19*  19*   BUN   --   15  18   CREA   --   1.09  1.43*   GLU   --   92  138*   WBC  6.2  7.8   --    HGB  9.6*  8.0*   --    HCT  29.2*  25.7*   --    PLT  388  300   --              Sung Beacholic, NP

## 2018-04-18 VITALS
BODY MASS INDEX: 19.04 KG/M2 | WEIGHT: 136 LBS | OXYGEN SATURATION: 95 % | HEART RATE: 85 BPM | SYSTOLIC BLOOD PRESSURE: 128 MMHG | DIASTOLIC BLOOD PRESSURE: 72 MMHG | RESPIRATION RATE: 24 BRPM | HEIGHT: 71 IN | TEMPERATURE: 97.6 F

## 2018-04-18 LAB
GLUCOSE BLD STRIP.AUTO-MCNC: 102 MG/DL (ref 65–100)
GLUCOSE BLD STRIP.AUTO-MCNC: 103 MG/DL (ref 65–100)
SERVICE CMNT-IMP: ABNORMAL
SERVICE CMNT-IMP: ABNORMAL

## 2018-04-18 PROCEDURE — 74011250637 HC RX REV CODE- 250/637: Performed by: INTERNAL MEDICINE

## 2018-04-18 PROCEDURE — 74011250637 HC RX REV CODE- 250/637: Performed by: NURSE PRACTITIONER

## 2018-04-18 PROCEDURE — 82962 GLUCOSE BLOOD TEST: CPT

## 2018-04-18 RX ORDER — ASPIRIN 325 MG
325 TABLET, DELAYED RELEASE (ENTERIC COATED) ORAL DAILY
Qty: 30 TAB | Refills: 0 | Status: SHIPPED
Start: 2018-04-19 | End: 2018-04-18

## 2018-04-18 RX ORDER — ATORVASTATIN CALCIUM 40 MG/1
40 TABLET, FILM COATED ORAL
Qty: 30 TAB | Refills: 0 | Status: SHIPPED | OUTPATIENT
Start: 2018-04-18

## 2018-04-18 RX ORDER — METOPROLOL SUCCINATE 25 MG/1
12.5 TABLET, EXTENDED RELEASE ORAL DAILY
Qty: 60 TAB | Refills: 0 | Status: SHIPPED | OUTPATIENT
Start: 2018-04-19

## 2018-04-18 RX ORDER — OMEPRAZOLE 20 MG/1
20 CAPSULE, DELAYED RELEASE ORAL 2 TIMES DAILY
Qty: 60 CAP | Refills: 0 | Status: SHIPPED | OUTPATIENT
Start: 2018-04-18

## 2018-04-18 RX ORDER — METFORMIN HYDROCHLORIDE 500 MG/1
250 TABLET ORAL 2 TIMES DAILY WITH MEALS
Qty: 60 TAB | Refills: 0 | Status: SHIPPED | OUTPATIENT
Start: 2018-04-18

## 2018-04-18 RX ORDER — ATORVASTATIN CALCIUM 40 MG/1
40 TABLET, FILM COATED ORAL
Qty: 30 TAB | Refills: 0 | Status: SHIPPED
Start: 2018-04-18 | End: 2018-04-18

## 2018-04-18 RX ORDER — CEFDINIR 300 MG/1
300 CAPSULE ORAL 2 TIMES DAILY
Qty: 20 CAP | Refills: 0 | Status: SHIPPED | OUTPATIENT
Start: 2018-04-18 | End: 2018-04-18

## 2018-04-18 RX ORDER — CEFDINIR 300 MG/1
300 CAPSULE ORAL 2 TIMES DAILY
Qty: 20 CAP | Refills: 0 | Status: SHIPPED | OUTPATIENT
Start: 2018-04-18

## 2018-04-18 RX ORDER — ASPIRIN 325 MG
325 TABLET, DELAYED RELEASE (ENTERIC COATED) ORAL DAILY
Qty: 30 TAB | Refills: 0 | Status: SHIPPED | OUTPATIENT
Start: 2018-04-19

## 2018-04-18 RX ORDER — METOPROLOL SUCCINATE 25 MG/1
12.5 TABLET, EXTENDED RELEASE ORAL DAILY
Qty: 60 TAB | Refills: 0 | Status: SHIPPED
Start: 2018-04-19 | End: 2018-04-18

## 2018-04-18 RX ADMIN — METOPROLOL SUCCINATE 12.5 MG: 25 TABLET, EXTENDED RELEASE ORAL at 08:16

## 2018-04-18 RX ADMIN — PANTOPRAZOLE SODIUM 40 MG: 40 TABLET, DELAYED RELEASE ORAL at 06:20

## 2018-04-18 RX ADMIN — Medication 10 ML: at 06:20

## 2018-04-18 RX ADMIN — ASPIRIN 325 MG: 325 TABLET, DELAYED RELEASE ORAL at 08:15

## 2018-04-18 NOTE — PROGRESS NOTES
PIV removed. Discharge instructions given and gone over with Pt and security guards. Extra discharge copy given for Pt's doctor. Opportunity to ask questions.

## 2018-04-18 NOTE — PROGRESS NOTES
[facility]  [address] [facility], [place], [address] [place] [address]  [phone]      Medical Progress Note      NAME:         Lara Gomes   :          MRM:        [id number]    Date:          2018      Subjective: no acute events. Denies cp, sob, abd pain. Objective:    Vital Signs:    Visit Vitals    /72 (BP 1 Location: Right arm, BP Patient Position: At rest)    Pulse 85    Temp 97.6 °F (36.4 °C)    Resp 24    Ht 5' 11\" (1.803 m)    Wt 61.7 kg (136 lb)    SpO2 95%    BMI 18.97 kg/m2        No intake or output data in the 24 hours ending 18 1211   Physical Examination:    General: Weak looking and not in much acute distress   Eyes:   pink conjunctivae, PERRLA with no discharge. ENT:   no ottorrhea or rhinorrhea with moist mucous membranes  Pulm:  clear breath sounds without crackles or wheezes  Card:  has regular and normal S1, S2 without thrills, bruits   Abd:  Soft, non-tender, non-distended, normoactive bowel sounds   Musc:  No cyanosis, clubbing, atrophy or deformities. Skin:  No rashes, bruising or ulcers. Neuro: Awake and alert, no focal deficit.       Current Facility-Administered Medications   Medication Dose Route Frequency    cefTRIAXone (ROCEPHIN) 1 g in 0.9% sodium chloride (MBP/ADV) 50 mL  1 g IntraVENous Q24H    aspirin delayed-release tablet 325 mg  325 mg Oral DAILY    metoprolol succinate (TOPROL-XL) XL tablet 12.5 mg  12.5 mg Oral DAILY    sodium chloride (NS) flush 5-10 mL  5-10 mL IntraVENous Q8H    sodium chloride (NS) flush 5-10 mL  5-10 mL IntraVENous PRN    naloxone (NARCAN) injection 0.4 mg  0.4 mg IntraVENous PRN    pantoprazole (PROTONIX) tablet 40 mg  40 mg Oral ACB&D    insulin lispro (HUMALOG) injection   SubCUTAneous TIDAC    glucose chewable tablet 16 g  4 Tab Oral PRN    dextrose (D50W) injection syrg 12.5-25 g  12.5-25 g IntraVENous PRN    glucagon (GLUCAGEN) injection 1 mg  1 mg IntraMUSCular PRN    atorvastatin (LIPITOR) tablet 40 mg  40 mg Oral QHS        Laboratory data and review:    Recent Labs      04/17/18   0338   WBC  6.2   HGB  9.6*   HCT  29.2*   PLT  388     No results for input(s): NA, K, CL, CO2, GLU, BUN, CREA, CA, MG, PHOS, ALB, TBIL, SGOT, ALT, INR in the last 72 hours. No lab exists for component: INREXT, INREXT  No components found for: [place]    Other Diagnostics:    Telemetry reviewed:   normal sinus rhythm    Assessment and Plan:    Septic shock due to a UTI: resolved. Blood and urine cultures grew Proteus mirabilis. Repeat blood cultures neg. Continue CTX; transitioned to cefdinir to complete course based on antibiotic sensitivities     Hydronephrosis with renal and ureteral calculous obstruction (4/14/2018): noted on CT abdomen. Seen by urology and he is sp cystoscopy and right retrograde pyelogram and right double J stent placement. He will need to follow up with urology in 2-3 weeks for further management of the stone. Elevated troponin (4/14/2018): type 2 MI, in the setting of septic shock. Pt has refused cardiac cath. Continue optimal medical management. Echo showed a normal EF with a possible small ASD. Continue ASA, atorvastatin, metoprolol. Type 2 diabetes mellitus without complication ([address] (0/16/4418): monitor blood glucose. SSI per protocol for now. Resume metformin    AGUEDA (acute kidney injury) ([address] (4/14/2018)/ High anion gap metabolic acidosis (8/09/2596): suspect from septic shock, has a hx of prostate ca and now ureteric stone. resolved    Emphysema of lung ([address] (4/14/2018): incidental on CT. Asymptomatic. Anemia: worse after hydration, suspect he has ACD. Monitor Hgb        Prostate CA: s/p robotic prostatectomy for prostate cancer at [facility] in Jan 2018.  Monitor     Total time spent for the patient's care: [address] discussed with: Patient and Nursing Staff    Discussed: Care Plan    Prophylaxis:  SCD's    Anticipated Disposition:  Correctional facility           ___________________________________________________    Attending Physician:   Jamie Ordoñez MD

## 2018-04-18 NOTE — DISCHARGE SUMMARY
Physician Discharge Summary     Patient ID:  Deepti Amor  [id number]  69 y.o.  1950    Admit date: 4/13/2018    Discharge date and time: 4/18/2018    Admission Diagnoses: Severe sepsis ([address]  URETERAL STONE    Discharge Diagnoses: Active Problems:    Elevated troponin (4/14/2018)      Septic shock ([address] (4/14/2018)      AGUEDA (acute kidney injury) ([address] (4/14/2018)      High anion gap metabolic acidosis (8/98/0783)      Hydronephrosis with renal and ureteral calculous obstruction (4/14/2018)      Type 2 diabetes mellitus without complication ([address] (7/23/5537)      Emphysema of lung [facility] (4/14/2018)           Hospital Course:   Septic shock due to a UTI: resolved. Blood and urine cultures grew Proteus mirabilis. Repeat blood cultures neg. Continue CTX; transitioned to cefdinir to complete course based on antibiotic sensitivities      Hydronephrosis with renal and ureteral calculous obstruction (4/14/2018): noted on CT abdomen. Seen by urology and he is sp cystoscopy and right retrograde pyelogram and right double J stent placement. He will need to follow up with urology in 2-3 weeks for further management of the stone.     Elevated troponin (4/14/2018): type 2 MI, in the setting of septic shock. Pt has refused cardiac cath. Continue optimal medical management. Echo showed a normal EF with a possible small ASD. Continue ASA, atorvastatin, metoprolol.       Type 2 diabetes mellitus without complication ([facility] (6/45/2294): monitor blood glucose. SSI per protocol for now. Resume metformin     AGUEDA (acute kidney injury) ([address] (4/14/2018)/ High anion gap metabolic acidosis (7/64/2146): suspect from septic shock, has a hx of prostate ca and now ureteric stone. resolved     Emphysema of lung ([address] (4/14/2018): incidental on CT. Asymptomatic.      Anemia: worse after hydration, suspect he has ACD. Monitor Hgb         Prostate CA: s/p robotic prostatectomy for prostate cancer at [address] [place] [address] in Jan 2018.  Monitor     PCP: None Consults: Cardiology, Nephrology and Urology    Condition of patient at discharge: improved    Discharge Exam:  Please refer to progress note from today. Disposition: home    Patient Instructions:   Current Discharge Medication List      START taking these medications    Details   aspirin delayed-release 325 mg tablet Take 1 Tab by mouth daily. Qty: 30 Tab, Refills: 0      atorvastatin (LIPITOR) 40 mg tablet Take 1 Tab by mouth nightly. Qty: 30 Tab, Refills: 0      metoprolol succinate (TOPROL-XL) 25 mg XL tablet Take 0.5 Tabs by mouth daily. Qty: 60 Tab, Refills: 0      cefdinir (OMNICEF) 300 mg capsule Take 1 Cap by mouth two (2) times a day. Qty: 20 Cap, Refills: 0         CONTINUE these medications which have NOT CHANGED    Details   SENNOSIDES/DOCUSATE SODIUM (SENNA-S PO) Take 1 Tab by mouth nightly. vitamin a & d (A&D) ointment Apply  to affected area three (3) times daily. Apply to affected area. METHYL SALICYLATE/MENTHOL (MATIAS ROBBINS EX) Apply  to affected area two (2) times daily as needed. metFORMIN (GLUCOPHAGE) 500 mg tablet Take 250 mg by mouth two (2) times daily (with meals). omeprazole (PRILOSEC) 20 mg capsule Take 20 mg by mouth two (2) times a day. oxybutynin (DITROPAN) 5 mg tablet Take 5 mg by mouth three (3) times daily. acetaminophen (TYLENOL EXTRA STRENGTH) 500 mg tablet Take 500 mg by mouth three (3) times daily as needed for Fever (temperature greater than 100F).          STOP taking these medications       cephALEXin (KEFLEX) 500 mg capsule Comments:   Reason for Stopping:             Activity: Activity as tolerated  Diet: Cardiac Diet  Wound Care: None needed        Approximate time spent in patient care on day of discharge: 33 minutes    Signed:  Alexandr Victor MD  4/18/2018  12:10 PM

## 2018-04-18 NOTE — DISCHARGE INSTRUCTIONS
HOSPITALIST DISCHARGE INSTRUCTIONS  NAME: Janice Regan   :  1950   MRN:  [id number]     Date/Time:  2018 12:09 PM    ADMIT DATE: 2018     DISCHARGE DATE: 2018     ADMITTING DIAGNOSIS:  Ureter stone  UTI  CAD    DISCHARGE DIAGNOSIS:  As above    MEDICATIONS:    · It is important that you take the medication exactly as they are prescribed. · Keep your medication in the bottles provided by the pharmacist and keep a list of the medication names, dosages, and times to be taken in your wallet. · Do not take other medications without consulting your doctor. Pain Management: per above medications    What to do at Home:  1. Pt refused cardiac cath while hospitalized. His cardiac regimen has been optimized. He should follow up with [facility] for further care  2. Follow up with urology in 2-3 weeks for further management of kidney stone    Recommended diet:  Cardiac Diet    Recommended activity: Activity as tolerated    If you experience any of the following symptoms then please call your primary care physician or return to the emergency room if you cannot get hold of your doctor:  Fever, chills, nausea, vomiting, diarrhea, change in mentation, falling, bleeding, shortness of breath         Information obtained by :  I understand that if any problems occur once I am at home I am to contact my physician. I understand and acknowledge receipt of the instructions indicated above.                                                                                                                                            Physician's or R.N.'s Signature                                                                  Date/Time                                                                                                                                              Patient or Representative Signature                                                          Date/Time

## 2018-04-18 NOTE — ROUTINE PROCESS
Bedside and Verbal shift change report given to Tae Fowler (oncoming nurse) by Ирина Encarnacion (offgoing nurse). Report included the following information SBAR, Kardex, Accordion and Recent Results.

## 2018-04-20 ENCOUNTER — PATIENT OUTREACH (OUTPATIENT)
Dept: INTERNAL MEDICINE CLINIC | Age: 68
End: 2018-04-20

## 2018-04-20 LAB
BACTERIA SPEC CULT: ABNORMAL
SERVICE CMNT-IMP: ABNORMAL

## 2018-04-20 RX ORDER — OXYBUTYNIN CHLORIDE 5 MG/1
5 TABLET ORAL 3 TIMES DAILY
COMMUNITY

## 2018-04-20 RX ORDER — OXYBUTYNIN CHLORIDE 5 MG/1
5 TABLET, EXTENDED RELEASE ORAL 3 TIMES DAILY
COMMUNITY
End: 2018-04-20

## 2018-04-20 NOTE — PROGRESS NOTES
Hospital Discharge Follow-Up      Date/Time:  2018 9:13 AM    Patient was admitted to [address] [place] [address] on  and discharged on  for septic shock. The physician discharge summary was available at the time of outreach. Patient is an inmate at [facility], facility was contacted within 2 business days of discharge. Inpatient RRAT score: 15  Was this a readmission? no   Patient stated reason for the readmission: n/a    Nurse Navigator (NN) contacted the nurse Ms. Suyapa Sood and Medical Director Dr. Rj Valles at the correctional facility by telephone to perform post hospital discharge assessment. Verified name and  with staff as identifiers. Provided introduction to self, and explanation of the Nurse Navigator role. Summary of patients top problems:    1. Septic Shock: related to UTI, blood & urine cxs + Proteus mirabilis, repeat blood cx negative, WBCs trending down, lactic acid normal    2. Right Uretal Calculus with Hydronephrosis: CT abd +, 9 mm obstructing stone, underwent cystoscopy with stent placement, large amount of pus drained, needs to f/u with urology in 2-3 weeks    3. MI: in the setting of septic shock, troponins elevated, ECHO with normal EF and possible small ASD, pt refused cardiac cath, continue ASA, atorvastatin, metoprolol    Home Health orders at discharge: [address]: n/a  Date of initial visit: [facility] ordered/company: n/a  Durable Medical Equipment received: n/a    Barriers to care? none identified    Advance Care Planning:   Does patient have an Advance Directive:  did not assess       Medication:     New Medications at Discharge: metoprolol, cefdinir, atorvastatin, asa  Changed Medications at Discharge: none  Discontinued Medications at Discharge: Keflex    Medication reconciliation was performed with nurse Ms. Suyapa Sood, who verbalizes understanding of administration of home medications.   There were no barriers to obtaining medications identified at this time. Referral to Pharm D needed: no     Current Outpatient Prescriptions   Medication Sig    oxybutynin (DITROPAN) 5 mg tablet Take 5 mg by mouth three (3) times daily.  aspirin delayed-release 325 mg tablet Take 1 Tab by mouth daily.  atorvastatin (LIPITOR) 40 mg tablet Take 1 Tab by mouth nightly.  cefdinir (OMNICEF) 300 mg capsule Take 1 Cap by mouth two (2) times a day.  metFORMIN (GLUCOPHAGE) 500 mg tablet Take 0.5 Tabs by mouth two (2) times daily (with meals).  metoprolol succinate (TOPROL-XL) 25 mg XL tablet Take 0.5 Tabs by mouth daily.  omeprazole (PRILOSEC) 20 mg capsule Take 1 Cap by mouth two (2) times a day. No current facility-administered medications for this visit. Medications Discontinued During This Encounter   Medication Reason    oxybutynin chloride XL (DITROPAN XL) 5 mg CR tablet Other       PCP/Specialist follow up:   [address] [place] [address] urology in 6/2018. Facility/provider/exact date not available. Patient declined f/u with cardiology at this time. States plan to follow up after release from correctional facility which is scheduled for 8/2018. Goals      Patient will have no hospital readmissions            4/20/18: Patient typically utilizes [facility] for care, but was sent to [facility] due to proximity and medical emergency. Patient is under the care of the nursing staff and physicians at [place] [address]. VS stable 113/64, 82, 18, 97.6, 94% on RA. Compliant with abx. Appointment scheduled with [address] [place] [address] urology.  -SRW

## 2018-04-21 LAB
BACTERIA SPEC CULT: NORMAL
SERVICE CMNT-IMP: NORMAL

## 2018-05-21 ENCOUNTER — PATIENT OUTREACH (OUTPATIENT)
Dept: INTERNAL MEDICINE CLINIC | Age: 68
End: 2018-05-21

## 2018-05-21 NOTE — PROGRESS NOTES
Patient has graduated from the Transitions of Care Coordination  program on 5/18/2018. Patient is a resident of [facility]. Care management goals have been completed at this time. No further nurse navigator follow up scheduled. Goals Addressed             Most Recent     COMPLETED: Patient will have no hospital readmissions   On track (5/21/2018)             4/20/18: Patient typically utilizes [facility] for care, but was sent to [facility] due to proximity and medical emergency. Patient is under the care of the nursing staff and physicians at [place] [address]. VS stable 113/64, 82, 18, 97.6, 94% on RA. Compliant with abx.  Appointment scheduled with [facility] urology June 2018. -SRW

## 2022-03-18 PROBLEM — R65.21 SEPTIC SHOCK (HCC): Status: ACTIVE | Noted: 2018-04-14

## 2022-03-18 PROBLEM — N17.9 AKI (ACUTE KIDNEY INJURY) (HCC): Status: ACTIVE | Noted: 2018-04-14

## 2022-03-18 PROBLEM — E87.29 HIGH ANION GAP METABOLIC ACIDOSIS: Status: ACTIVE | Noted: 2018-04-14

## 2022-03-18 PROBLEM — R77.8 ELEVATED TROPONIN: Status: ACTIVE | Noted: 2018-04-14

## 2022-03-18 PROBLEM — A41.9 SEPTIC SHOCK (HCC): Status: ACTIVE | Noted: 2018-04-14

## 2022-03-18 PROBLEM — R79.89 ELEVATED TROPONIN: Status: ACTIVE | Noted: 2018-04-14

## 2022-03-19 PROBLEM — J43.9 EMPHYSEMA OF LUNG (HCC): Status: ACTIVE | Noted: 2018-04-14

## 2022-03-19 PROBLEM — N13.2 HYDRONEPHROSIS WITH RENAL AND URETERAL CALCULOUS OBSTRUCTION: Status: ACTIVE | Noted: 2018-04-14

## 2022-03-19 PROBLEM — E11.9 TYPE 2 DIABETES MELLITUS WITHOUT COMPLICATION (HCC): Status: ACTIVE | Noted: 2018-04-14

## 2023-09-01 ENCOUNTER — HOSPITAL ENCOUNTER (OUTPATIENT)
Facility: HOSPITAL | Age: 73
Setting detail: OUTPATIENT SURGERY
Discharge: HOME | End: 2023-09-01
Attending: INTERNAL MEDICINE | Admitting: INTERNAL MEDICINE
Payer: MEDICAID

## 2023-09-01 ENCOUNTER — ANESTHESIA EVENT (OUTPATIENT)
Facility: HOSPITAL | Age: 73
End: 2023-09-01
Payer: MEDICARE

## 2023-09-01 ENCOUNTER — ANESTHESIA (OUTPATIENT)
Facility: HOSPITAL | Age: 73
End: 2023-09-01
Payer: MEDICARE

## 2023-09-01 VITALS
OXYGEN SATURATION: 100 % | TEMPERATURE: 97 F | DIASTOLIC BLOOD PRESSURE: 69 MMHG | HEART RATE: 70 BPM | RESPIRATION RATE: 22 BRPM | SYSTOLIC BLOOD PRESSURE: 107 MMHG | BODY MASS INDEX: 18.62 KG/M2 | HEIGHT: 71 IN | WEIGHT: 133 LBS

## 2023-09-01 PROCEDURE — 6370000000 HC RX 637 (ALT 250 FOR IP): Performed by: INTERNAL MEDICINE

## 2023-09-01 PROCEDURE — 3700000001 HC ADD 15 MINUTES (ANESTHESIA): Performed by: INTERNAL MEDICINE

## 2023-09-01 PROCEDURE — 7100000010 HC PHASE II RECOVERY - FIRST 15 MIN: Performed by: INTERNAL MEDICINE

## 2023-09-01 PROCEDURE — 2500000003 HC RX 250 WO HCPCS: Performed by: NURSE ANESTHETIST, CERTIFIED REGISTERED

## 2023-09-01 PROCEDURE — 6360000002 HC RX W HCPCS: Performed by: NURSE ANESTHETIST, CERTIFIED REGISTERED

## 2023-09-01 PROCEDURE — 88342 IMHCHEM/IMCYTCHM 1ST ANTB: CPT

## 2023-09-01 PROCEDURE — C1889 IMPLANT/INSERT DEVICE, NOC: HCPCS | Performed by: INTERNAL MEDICINE

## 2023-09-01 PROCEDURE — 3700000000 HC ANESTHESIA ATTENDED CARE: Performed by: INTERNAL MEDICINE

## 2023-09-01 PROCEDURE — 7100000011 HC PHASE II RECOVERY - ADDTL 15 MIN: Performed by: INTERNAL MEDICINE

## 2023-09-01 PROCEDURE — 3600007502: Performed by: INTERNAL MEDICINE

## 2023-09-01 PROCEDURE — 3600007512: Performed by: INTERNAL MEDICINE

## 2023-09-01 PROCEDURE — C1713 ANCHOR/SCREW BN/BN,TIS/BN: HCPCS | Performed by: INTERNAL MEDICINE

## 2023-09-01 PROCEDURE — 88305 TISSUE EXAM BY PATHOLOGIST: CPT

## 2023-09-01 PROCEDURE — 2709999900 HC NON-CHARGEABLE SUPPLY: Performed by: INTERNAL MEDICINE

## 2023-09-01 PROCEDURE — 2580000003 HC RX 258: Performed by: NURSE ANESTHETIST, CERTIFIED REGISTERED

## 2023-09-01 DEVICE — WORKING LENGTH 235CM, WORKING CHANNEL 2.8MM
Type: IMPLANTABLE DEVICE | Site: DUODENUM | Status: FUNCTIONAL
Brand: RESOLUTION 360 CLIP

## 2023-09-01 RX ORDER — PHENYLEPHRINE HCL IN 0.9% NACL 0.4MG/10ML
SYRINGE (ML) INTRAVENOUS PRN
Status: DISCONTINUED | OUTPATIENT
Start: 2023-09-01 | End: 2023-09-01 | Stop reason: SDUPTHER

## 2023-09-01 RX ORDER — METOPROLOL SUCCINATE 25 MG/1
12.5 TABLET, EXTENDED RELEASE ORAL DAILY
COMMUNITY
Start: 2018-04-19

## 2023-09-01 RX ORDER — SODIUM CHLORIDE 9 MG/ML
INJECTION, SOLUTION INTRAVENOUS CONTINUOUS PRN
Status: DISCONTINUED | OUTPATIENT
Start: 2023-09-01 | End: 2023-09-01 | Stop reason: SDUPTHER

## 2023-09-01 RX ORDER — ASPIRIN 325 MG
325 TABLET, DELAYED RELEASE (ENTERIC COATED) ORAL DAILY
Status: ON HOLD | COMMUNITY
Start: 2018-04-19 | End: 2023-09-01

## 2023-09-01 RX ORDER — CARVEDILOL 3.12 MG/1
TABLET ORAL
COMMUNITY
Start: 2023-05-26

## 2023-09-01 RX ORDER — ATORVASTATIN CALCIUM 40 MG/1
TABLET, FILM COATED ORAL
COMMUNITY
Start: 2023-07-21

## 2023-09-01 RX ORDER — LIDOCAINE HYDROCHLORIDE 20 MG/ML
INJECTION, SOLUTION EPIDURAL; INFILTRATION; INTRACAUDAL; PERINEURAL PRN
Status: DISCONTINUED | OUTPATIENT
Start: 2023-09-01 | End: 2023-09-01 | Stop reason: SDUPTHER

## 2023-09-01 RX ORDER — ALBUTEROL SULFATE 90 UG/1
AEROSOL, METERED RESPIRATORY (INHALATION)
COMMUNITY
Start: 2023-07-21

## 2023-09-01 RX ORDER — IBUPROFEN 800 MG/1
TABLET ORAL
COMMUNITY
Start: 2023-08-24

## 2023-09-01 RX ORDER — ASPIRIN 81 MG/1
81 TABLET ORAL DAILY
COMMUNITY

## 2023-09-01 RX ORDER — OMEPRAZOLE 20 MG/1
20 CAPSULE, DELAYED RELEASE ORAL 2 TIMES DAILY
COMMUNITY
Start: 2018-04-18

## 2023-09-01 RX ORDER — SIMETHICONE 20 MG/.3ML
EMULSION ORAL PRN
Status: DISCONTINUED | OUTPATIENT
Start: 2023-09-01 | End: 2023-09-01 | Stop reason: ALTCHOICE

## 2023-09-01 RX ORDER — OXYBUTYNIN CHLORIDE 5 MG/1
5 TABLET ORAL 3 TIMES DAILY
COMMUNITY

## 2023-09-01 RX ADMIN — PROPOFOL 10 MG: 10 INJECTION, EMULSION INTRAVENOUS at 15:00

## 2023-09-01 RX ADMIN — Medication 120 MCG: at 14:51

## 2023-09-01 RX ADMIN — PROPOFOL 40 MG: 10 INJECTION, EMULSION INTRAVENOUS at 14:37

## 2023-09-01 RX ADMIN — PROPOFOL 30 MG: 10 INJECTION, EMULSION INTRAVENOUS at 14:29

## 2023-09-01 RX ADMIN — PROPOFOL 30 MG: 10 INJECTION, EMULSION INTRAVENOUS at 14:27

## 2023-09-01 RX ADMIN — PROPOFOL 30 MG: 10 INJECTION, EMULSION INTRAVENOUS at 14:44

## 2023-09-01 RX ADMIN — PROPOFOL 20 MG: 10 INJECTION, EMULSION INTRAVENOUS at 14:33

## 2023-09-01 RX ADMIN — PROPOFOL 10 MG: 10 INJECTION, EMULSION INTRAVENOUS at 14:55

## 2023-09-01 RX ADMIN — PROPOFOL 30 MG: 10 INJECTION, EMULSION INTRAVENOUS at 14:25

## 2023-09-01 RX ADMIN — PROPOFOL 30 MG: 10 INJECTION, EMULSION INTRAVENOUS at 14:48

## 2023-09-01 RX ADMIN — PROPOFOL 30 MG: 10 INJECTION, EMULSION INTRAVENOUS at 14:31

## 2023-09-01 RX ADMIN — PROPOFOL 60 MG: 10 INJECTION, EMULSION INTRAVENOUS at 14:22

## 2023-09-01 RX ADMIN — SODIUM CHLORIDE: 9 INJECTION, SOLUTION INTRAVENOUS at 14:22

## 2023-09-01 RX ADMIN — LIDOCAINE HYDROCHLORIDE 80 MG: 20 INJECTION, SOLUTION EPIDURAL; INFILTRATION; INTRACAUDAL; PERINEURAL at 14:22

## 2023-09-01 RX ADMIN — PROPOFOL 20 MG: 10 INJECTION, EMULSION INTRAVENOUS at 15:03

## 2023-09-01 NOTE — PERIOP NOTE

## 2023-09-01 NOTE — ANESTHESIA POSTPROCEDURE EVALUATION
Department of Anesthesiology  Postprocedure Note    Patient: Isidoro Melvin  MRN: [id number]  YOB: 1950  Date of evaluation: 9/1/2023      Procedure Summary     Date: 09/01/23 Room / Location: [facility] ENDO 03 / [facility] ENDOSCOPY    Anesthesia Start: 4694 Anesthesia Stop: 7796    Procedures:       EGD ESOPHAGOGASTRODUODENOSCOPY (Upper GI Region)      COLONOSCOPY DIAGNOSTIC (Lower GI Region)      EGD CONTROL HEMORRHAGE (Upper GI Region) Diagnosis:       Abdominal pain, unspecified abdominal location      (Abdominal pain, unspecified abdominal location [R10.9])    Surgeons: Dottie Duque MD Responsible Provider: Felicia Blanchard DO    Anesthesia Type: MAC ASA Status: 3          Anesthesia Type: No value filed.     Shannan Phase I: Shannan Score: 10    Shannan Phase II: Shannan Score: 10      Anesthesia Post Evaluation    Patient location during evaluation: bedside  Patient participation: complete - patient participated  Level of consciousness: awake and alert  Pain score: 0  Airway patency: patent  Nausea & Vomiting: no nausea and no vomiting  Complications: no  Cardiovascular status: hemodynamically stable and blood pressure returned to baseline  Respiratory status: acceptable and room air  Hydration status: euvolemic  Pain management: adequate

## 2023-09-01 NOTE — H&P
[address] [place] [address] [facility]          Pre-procedure History and Physical       NAME:  Jason Martinez   :   1950   MRN:   [id number]     CHIEF COMPLAINT/HPI: iron def anemia    PMH:  Past Medical History:   Diagnosis Date    Hypertension        PSH:  Past Surgical History:   Procedure Laterality Date    HERNIA REPAIR      PROSTATECTOMY         Allergies:  No Known Allergies    Home Medications:  Prior to Admission Medications   Prescriptions Last Dose Informant Patient Reported? Taking? albuterol sulfate HFA (PROVENTIL;VENTOLIN;PROAIR) 108 (90 Base) MCG/ACT inhaler 2023  Yes No   aspirin 325 MG EC tablet Not Taking  Yes No   Sig: Take 1 tablet by mouth daily   Patient not taking: Reported on 2023   aspirin 81 MG EC tablet 2023  Yes Yes   Sig: Take 1 tablet by mouth daily   atorvastatin (LIPITOR) 40 MG tablet 2023  Yes No   carvedilol (COREG) 3.125 MG tablet 2023  Yes No   diclofenac sodium (VOLTAREN) 1 % GEL 2023  Yes No   ibuprofen (ADVIL;MOTRIN) 800 MG tablet 2023  Yes No   metoprolol succinate (TOPROL XL) 25 MG extended release tablet 2023  Yes Yes   Sig: Take 0.5 tablets by mouth daily   omeprazole (PRILOSEC) 20 MG delayed release capsule 2023  Yes Yes   Sig: Take 1 capsule by mouth 2 times daily   oxybutynin (DITROPAN) 5 MG tablet 2023  Yes No   Sig: Take 1 tablet by mouth 3 times daily      Facility-Administered Medications: None       Hospital Medications:  No current facility-administered medications for this encounter. Family History:  History reviewed. No pertinent family history.     Social History:  Social History     Tobacco Use    Smoking status: Every Day     Packs/day: 0.25     Types: Cigarettes    Smokeless tobacco: Not on file   Substance Use Topics    Alcohol use: Yes         PHYSICAL EXAM PRIOR TO SEDATION:  General: Alert, in no acute distress    Lungs:            CTA

## 2023-09-01 NOTE — OP NOTE
[place] Maxine Merlin, M.D.  Duran Finney, [address] [facility]  [phone]               Esophagogastroduodenoscopy (EGD) Procedure Note    NAME: Sanjay De La Garza  :  1950  MRN:  [id number]    Indications: iron def anemia    : Padmini Dominguez MD    Referring Provider:  No primary care provider on file. Staff: Circulator: Joseph Tierney RN  Endoscopy Technician: Pauline Villanueva    Prosthetic devices, grafts, tissues, transplant, or devices implanted: none    Medicine:  MAC anesthesia      Procedure Details:  After informed consent was obtained with all risks and benefits of the procedure explained and preprocedure exam completed, the patient was placed in the left lateral decubitus position. Universal protocol for patient identification was performed and documented in the nursing notes. Throughout the procedure, the patient's blood pressure was monitored at least every five minutes; pulse, and oxygen saturations were monitored continuously. All vital signs were documented in the nursing notes. The endoscope was inserted into the mouth and advanced under direct vision to second portion of the duodenum. A careful inspection was made as the gastroscope was withdrawn, including a retroflexed view of the proximal stomach; findings and interventions are described below.       Findings:   Esophagus:normal  Stomach: 7 cm hiatal hernia-no obvious Leonid erosions; mild, diffuse linear erythema s/p biopsies throughout the stomach  Duodenum: 2 mm AVM in the proximal duodenum s/p APC at the small bowel setting with oozing of blood afterwards and cessation with placement of one hemoclip, 2 mm sessile polyp in the distal bulb s/p cold forceps polypectomy, rest of the examined duodenum with mild patchy erythema s/p biopsies    Interventions:    biopsy of stomach  and duodenum and APC in the duodenum    Specimens:   ID Type Source Tests Collected by Time Destination   A : duodenum biopsy, r/o celiac Tissue Duodenum SURGICAL PATHOLOGY Wally Beckford MD 9/1/2023 1425    B : Duodenal polyp Tissue Duodenum SURGICAL PATHOLOGY Wally Beckford MD 9/1/2023 1428    C : gastric biopsy, r/o h.pylori Tissue Gastric SURGICAL PATHOLOGY Wally Beckford MD 9/1/2023 1435    D : Ascending colon polyp Tissue Colon-Ascending SURGICAL PATHOLOGY Wally Beckford MD 9/1/2023 1458         EBL: None          Complications:     No immediate complications        Impression:  -See post-procedure diagnoses. Recommendations:  -Await pathology. Signed by:  Jesus Loyola MD         9/1/2023 3:10 PM

## 2024-01-02 ENCOUNTER — TRANSCRIBE ORDERS (OUTPATIENT)
Facility: HOSPITAL | Age: 74
End: 2024-01-02

## 2024-01-02 DIAGNOSIS — R10.9 ABDOMINAL PAIN, UNSPECIFIED ABDOMINAL LOCATION: ICD-10-CM

## 2024-01-02 DIAGNOSIS — R10.9 STOMACH ACHE: Primary | ICD-10-CM

## 2024-01-02 DIAGNOSIS — R51.9 NONINTRACTABLE HEADACHE, UNSPECIFIED CHRONICITY PATTERN, UNSPECIFIED HEADACHE TYPE: Primary | ICD-10-CM

## 2024-01-02 DIAGNOSIS — R06.02 SHORTNESS OF BREATH: ICD-10-CM

## 2024-01-17 ENCOUNTER — HOSPITAL ENCOUNTER (OUTPATIENT)
Facility: HOSPITAL | Age: 74
Discharge: HOME | End: 2024-01-20
Payer: COMMERCIAL

## 2024-01-17 ENCOUNTER — HOSPITAL ENCOUNTER (OUTPATIENT)
Facility: HOSPITAL | Age: 74
End: 2024-01-17
Payer: COMMERCIAL

## 2024-01-17 ENCOUNTER — APPOINTMENT (OUTPATIENT)
Facility: HOSPITAL | Age: 74
End: 2024-01-17
Payer: COMMERCIAL

## 2024-01-17 DIAGNOSIS — R10.9 STOMACH ACHE: ICD-10-CM

## 2024-01-17 DIAGNOSIS — R51.9 NONINTRACTABLE HEADACHE, UNSPECIFIED CHRONICITY PATTERN, UNSPECIFIED HEADACHE TYPE: ICD-10-CM

## 2024-01-17 PROCEDURE — 2500000003 HC RX 250 WO HCPCS: Performed by: STUDENT IN AN ORGANIZED HEALTH CARE EDUCATION/TRAINING PROGRAM

## 2024-01-17 PROCEDURE — 70450 CT HEAD/BRAIN W/O DYE: CPT

## 2024-01-17 PROCEDURE — 74176 CT ABD & PELVIS W/O CONTRAST: CPT

## 2024-01-17 RX ADMIN — BARIUM SULFATE 900 ML: 20 SUSPENSION ORAL at 10:15

## 2024-01-23 ENCOUNTER — TRANSCRIBE ORDERS (OUTPATIENT)
Facility: HOSPITAL | Age: 74
End: 2024-01-23

## 2024-01-23 DIAGNOSIS — R91.8 LUNG MASS: Primary | ICD-10-CM

## 2024-03-05 ENCOUNTER — TRANSCRIBE ORDERS (OUTPATIENT)
Facility: HOSPITAL | Age: 74
End: 2024-03-05

## 2024-03-05 DIAGNOSIS — R01.1 UNDIAGNOSED CARDIAC MURMURS: Primary | ICD-10-CM

## 2024-03-13 ENCOUNTER — HOSPITAL ENCOUNTER (OUTPATIENT)
Facility: HOSPITAL | Age: 74
Discharge: HOME | End: 2024-03-16
Payer: COMMERCIAL

## 2024-03-13 DIAGNOSIS — R91.8 LUNG MASS: ICD-10-CM

## 2024-03-13 LAB
GLUCOSE BLD STRIP.AUTO-MCNC: 98 MG/DL (ref 65–117)
SERVICE CMNT-IMP: NORMAL

## 2024-03-13 PROCEDURE — A9609 HC RX DIAGNOSTIC RADIOPHARMACEUTICAL: HCPCS | Performed by: STUDENT IN AN ORGANIZED HEALTH CARE EDUCATION/TRAINING PROGRAM

## 2024-03-13 PROCEDURE — 82962 GLUCOSE BLOOD TEST: CPT

## 2024-03-13 PROCEDURE — 78815 PET IMAGE W/CT SKULL-THIGH: CPT

## 2024-03-13 PROCEDURE — 3430000000 HC RX DIAGNOSTIC RADIOPHARMACEUTICAL: Performed by: STUDENT IN AN ORGANIZED HEALTH CARE EDUCATION/TRAINING PROGRAM

## 2024-03-13 RX ORDER — FLUDEOXYGLUCOSE F-18 500 MCI/ML
10 INJECTION INTRAVENOUS
Status: COMPLETED | OUTPATIENT
Start: 2024-03-13 | End: 2024-03-13

## 2024-03-13 RX ADMIN — FLUDEOXYGLUCOSE F-18 10 MILLICURIE: 500 INJECTION INTRAVENOUS at 08:05

## 2024-03-18 ENCOUNTER — HOSPITAL ENCOUNTER (OUTPATIENT)
Facility: HOSPITAL | Age: 74
Discharge: HOME | End: 2024-03-20
Payer: COMMERCIAL

## 2024-03-18 DIAGNOSIS — R01.1 UNDIAGNOSED CARDIAC MURMURS: ICD-10-CM

## 2024-03-18 LAB
ECHO AO ROOT DIAM: 2.6 CM
ECHO AV AREA PEAK VELOCITY: 3.2 CM2
ECHO AV AREA PLAN: 2.8 CM2
ECHO AV PEAK GRADIENT: 2 MMHG
ECHO AV PEAK VELOCITY: 0.8 M/S
ECHO AV VELOCITY RATIO: 0.88
ECHO EST RA PRESSURE: 5 MMHG
ECHO LA DIAMETER: 2.9 CM
ECHO LA TO AORTIC ROOT RATIO: 1.12
ECHO LA VOL A-L A4C: 42 ML (ref 18–58)
ECHO LA VOL MOD A4C: 39 ML (ref 18–58)
ECHO LV EDV A4C: 87 ML
ECHO LV EJECTION FRACTION A4C: 42 %
ECHO LV ESV A4C: 50 ML
ECHO LV FRACTIONAL SHORTENING: 21 % (ref 28–44)
ECHO LV INTERNAL DIMENSION DIASTOLIC: 4.2 CM (ref 4.2–5.9)
ECHO LV INTERNAL DIMENSION SYSTOLIC: 3.3 CM
ECHO LV IVSD: 1.3 CM (ref 0.6–1)
ECHO LV MASS 2D: 167.4 G (ref 88–224)
ECHO LV POSTERIOR WALL DIASTOLIC: 1 CM (ref 0.6–1)
ECHO LV RELATIVE WALL THICKNESS RATIO: 0.48
ECHO LVOT AREA: 3.5 CM2
ECHO LVOT DIAM: 2.1 CM
ECHO LVOT PEAK GRADIENT: 2 MMHG
ECHO LVOT PEAK VELOCITY: 0.7 M/S
ECHO MV A VELOCITY: 0.38 M/S
ECHO MV AREA PHT: 3 CM2
ECHO MV E DECELERATION TIME (DT): 168.7 MS
ECHO MV E VELOCITY: 0.26 M/S
ECHO MV E/A RATIO: 0.68
ECHO MV MAX VELOCITY: 0.5 M/S
ECHO MV MEAN GRADIENT: 0 MMHG
ECHO MV MEAN VELOCITY: 0.3 M/S
ECHO MV PEAK GRADIENT: 1 MMHG
ECHO MV PRESSURE HALF TIME (PHT): 73.5 MS
ECHO MV VTI: 15.5 CM
ECHO RIGHT VENTRICULAR SYSTOLIC PRESSURE (RVSP): 26 MMHG
ECHO TV REGURGITANT MAX VELOCITY: 2.31 M/S
ECHO TV REGURGITANT PEAK GRADIENT: 22 MMHG

## 2024-03-18 PROCEDURE — 93306 TTE W/DOPPLER COMPLETE: CPT | Performed by: INTERNAL MEDICINE

## 2024-03-18 PROCEDURE — 93306 TTE W/DOPPLER COMPLETE: CPT

## 2024-09-12 ENCOUNTER — TRANSCRIBE ORDERS (OUTPATIENT)
Facility: HOSPITAL | Age: 74
End: 2024-09-12

## 2024-09-12 DIAGNOSIS — C34.31 MALIGNANT NEOPLASM OF BRONCHUS OF LOWER LOBE, RIGHT (HCC): Primary | ICD-10-CM

## 2024-09-12 DIAGNOSIS — C34.31 PRIMARY MALIGNANT NEOPLASM OF BRONCHUS OF RIGHT LOWER LOBE (HCC): Primary | ICD-10-CM

## 2024-09-17 ENCOUNTER — HOSPITAL ENCOUNTER (OUTPATIENT)
Facility: HOSPITAL | Age: 74
Discharge: HOME | End: 2024-09-20
Payer: MEDICARE

## 2024-09-17 DIAGNOSIS — C34.31 PRIMARY MALIGNANT NEOPLASM OF BRONCHUS OF RIGHT LOWER LOBE (HCC): ICD-10-CM

## 2024-09-17 PROCEDURE — A9579 GAD-BASE MR CONTRAST NOS,1ML: HCPCS

## 2024-09-17 PROCEDURE — 70553 MRI BRAIN STEM W/O & W/DYE: CPT

## 2024-09-17 PROCEDURE — 6360000004 HC RX CONTRAST MEDICATION

## 2024-09-17 RX ADMIN — GADOTERIDOL 13 ML: 279.3 INJECTION, SOLUTION INTRAVENOUS at 17:36

## 2024-10-02 ENCOUNTER — HOSPITAL ENCOUNTER (OUTPATIENT)
Facility: HOSPITAL | Age: 74
Discharge: HOME | End: 2024-10-05

## 2024-10-02 DIAGNOSIS — C34.31 MALIGNANT NEOPLASM OF BRONCHUS OF LOWER LOBE, RIGHT (HCC): ICD-10-CM

## 2024-10-03 ENCOUNTER — HOSPITAL ENCOUNTER (OUTPATIENT)
Facility: HOSPITAL | Age: 74
Discharge: HOME | End: 2024-10-06
Payer: MEDICARE

## 2024-10-03 LAB
GLUCOSE BLD STRIP.AUTO-MCNC: 106 MG/DL (ref 65–117)
SERVICE CMNT-IMP: NORMAL

## 2024-10-03 PROCEDURE — 3430000000 HC RX DIAGNOSTIC RADIOPHARMACEUTICAL

## 2024-10-03 PROCEDURE — 78815 PET IMAGE W/CT SKULL-THIGH: CPT

## 2024-10-03 PROCEDURE — A9609 HC RX DIAGNOSTIC RADIOPHARMACEUTICAL: HCPCS

## 2024-10-03 PROCEDURE — 82962 GLUCOSE BLOOD TEST: CPT

## 2024-10-03 RX ORDER — FLUDEOXYGLUCOSE F-18 500 MCI/ML
10 INJECTION INTRAVENOUS ONCE
Status: COMPLETED | OUTPATIENT
Start: 2024-10-03 | End: 2024-10-03

## 2024-10-03 RX ADMIN — FLUDEOXYGLUCOSE F-18 10 MILLICURIE: 500 INJECTION INTRAVENOUS at 08:39

## (undated) DEVICE — DEVON™ KNEE AND BODY STRAP 60" X 3" (1.5 M X 7.6 CM): Brand: DEVON

## (undated) DEVICE — MEDI-VAC NON-CONDUCTIVE SUCTION TUBING: Brand: CARDINAL HEALTH

## (undated) DEVICE — TUBING IRRIG COMPATIBLE W ERBE MEDIVATOR PMP HYDR

## (undated) DEVICE — FORCEPS BX L240CM JAW DIA2.8MM L CAP W/ NDL MIC MESH TOOTH

## (undated) DEVICE — JELLY,LUBE,STERILE,FLIP TOP,TUBE,4-OZ: Brand: MEDLINE

## (undated) DEVICE — OPEN-END URETERAL CATHETER: Brand: COOK

## (undated) DEVICE — Z DISCONTINUEDSOLUTION PREP 2OZ 10% POVIDONE IOD SCR CAP BTL

## (undated) DEVICE — PROBE COAG L330CM DIA2.3MM STR FIRE ARC SMRT

## (undated) DEVICE — STERILE POLYISOPRENE POWDER-FREE SURGICAL GLOVES: Brand: PROTEXIS

## (undated) DEVICE — RESOLUTION 360 CLIP 155CM

## (undated) DEVICE — ELECTRODE PT RET AD L9FT HI MOIST COND ADH HYDRGEL CORDED

## (undated) DEVICE — RADIFOCUS GLIDEWIRE: Brand: GLIDEWIRE

## (undated) DEVICE — CYSTOSCOPY PACK: Brand: CONVERTORS

## (undated) DEVICE — LIGHT HANDLE: Brand: DEVON

## (undated) DEVICE — SNARE ENDOSCP DIA9MM SHTH DIA2.4MM CLD FOR POLYP EXACTO

## (undated) DEVICE — SOLUTION IRRIGATION H2O 0797305] ICU MEDICAL INC]

## (undated) DEVICE — SYR 10ML LUER LOK 1/5ML GRAD --

## (undated) DEVICE — GOWN,SIRUS,FABRNF,XL,20/CS: Brand: MEDLINE

## (undated) DEVICE — CATHETER F BLLN 5CC 16FR 2 W HYDRGEL COAT LESS TRAUM LUB

## (undated) DEVICE — CYSTO/BLADDER IRRIGATION SET, REGULATING CLAMP

## (undated) DEVICE — CATH URET 8FRX65CM OPN CONE --

## (undated) DEVICE — TRAP SURG QUAD PARABOLA SLOT DSGN SFTY SCRN TRAPEASE